# Patient Record
Sex: MALE | Race: WHITE | Employment: OTHER | URBAN - METROPOLITAN AREA
[De-identification: names, ages, dates, MRNs, and addresses within clinical notes are randomized per-mention and may not be internally consistent; named-entity substitution may affect disease eponyms.]

---

## 2017-07-18 PROBLEM — F03.90 DEMENTIA WITHOUT BEHAVIORAL DISTURBANCE (HCC): Status: ACTIVE | Noted: 2017-07-18

## 2017-08-08 ENCOUNTER — HOSPITAL ENCOUNTER (OUTPATIENT)
Dept: PAIN MANAGEMENT | Age: 67
Discharge: HOME | End: 2017-08-08
Payer: MEDICARE

## 2017-08-08 VITALS
BODY MASS INDEX: 37.93 KG/M2 | HEIGHT: 66 IN | OXYGEN SATURATION: 98 % | TEMPERATURE: 98.6 F | HEART RATE: 68 BPM | RESPIRATION RATE: 20 BRPM | WEIGHT: 236 LBS

## 2017-08-08 DIAGNOSIS — M25.571 CHRONIC PAIN OF RIGHT ANKLE: Primary | ICD-10-CM

## 2017-08-08 DIAGNOSIS — M12.571 TRAUMATIC ARTHRITIS OF ANKLE, RIGHT: ICD-10-CM

## 2017-08-08 DIAGNOSIS — G89.29 CHRONIC PAIN OF RIGHT ANKLE: Primary | ICD-10-CM

## 2017-08-08 DIAGNOSIS — Z98.1 S/P ANKLE FUSION: ICD-10-CM

## 2017-08-08 PROCEDURE — 80307 DRUG TEST PRSMV CHEM ANLYZR: CPT

## 2017-08-08 PROCEDURE — 99205 OFFICE O/P NEW HI 60 MIN: CPT

## 2017-08-08 PROCEDURE — 99204 OFFICE O/P NEW MOD 45 MIN: CPT | Performed by: PAIN MEDICINE

## 2017-08-08 ASSESSMENT — PAIN DESCRIPTION - FREQUENCY: FREQUENCY: CONTINUOUS

## 2017-08-08 ASSESSMENT — ENCOUNTER SYMPTOMS
NAUSEA: 0
PHOTOPHOBIA: 0
COUGH: 0
HEARTBURN: 0
ORTHOPNEA: 0
SPUTUM PRODUCTION: 0
BLURRED VISION: 0
VOMITING: 0
CONSTIPATION: 0

## 2017-08-08 ASSESSMENT — PAIN DESCRIPTION - ONSET: ONSET: ON-GOING

## 2017-08-08 ASSESSMENT — PAIN DESCRIPTION - LOCATION: LOCATION: ANKLE

## 2017-08-08 ASSESSMENT — PAIN DESCRIPTION - ORIENTATION: ORIENTATION: RIGHT

## 2017-08-08 ASSESSMENT — PAIN DESCRIPTION - DESCRIPTORS: DESCRIPTORS: ACHING;CONSTANT;SHARP

## 2017-08-08 ASSESSMENT — PAIN SCALES - GENERAL: PAINLEVEL_OUTOF10: 8

## 2017-08-08 ASSESSMENT — PAIN DESCRIPTION - PAIN TYPE: TYPE: CHRONIC PAIN

## 2017-08-08 ASSESSMENT — PAIN DESCRIPTION - PROGRESSION: CLINICAL_PROGRESSION: NOT CHANGED

## 2017-08-12 LAB
6-ACETYLMORPHINE, UR: NOT DETECTED
7-AMINOCLONAZEPAM, URINE: NOT DETECTED
ALPHA-OH-ALPRAZ, URINE: NOT DETECTED
ALPRAZOLAM, URINE: NOT DETECTED
AMPHETAMINES, URINE: NOT DETECTED
BARBITURATES, URINE: NOT DETECTED
BENZOYLECGONINE, UR: NOT DETECTED
BUPRENORPHINE URINE: NOT DETECTED
CARISOPRODOL, UR: NOT DETECTED
CLONAZEPAM, URINE: NOT DETECTED
CODEINE, URINE: NOT DETECTED
CREATININE URINE: 361.3 MG/DL (ref 20–400)
DIAZEPAM, URINE: NOT DETECTED
DRUGS EXPECTED, UR: NORMAL
EER HI RES INTERP UR: NORMAL
ETHYL GLUCURONIDE UR: NOT DETECTED
FENTANYL URINE: NOT DETECTED
HYDROCODONE, URINE: NOT DETECTED
HYDROMORPHONE, URINE: NOT DETECTED
LORAZEPAM, URINE: NOT DETECTED
MARIJUANA METAB, UR: NOT DETECTED
MDA, UR: NOT DETECTED
MDEA, EVE, UR: NOT DETECTED
MDMA URINE: NOT DETECTED
MEPERIDINE METAB, UR: NOT DETECTED
METHADONE, URINE: NOT DETECTED
METHAMPHETAMINE, URINE: NOT DETECTED
METHYLPHENIDATE: NOT DETECTED
MIDAZOLAM, URINE: NOT DETECTED
MORPHINE URINE: NOT DETECTED
NORBUPRENORPHINE, URINE: NOT DETECTED
NORDIAZEPAM, URINE: NOT DETECTED
NORFENTANYL, URINE: NOT DETECTED
NORHYDROCODONE, URINE: NOT DETECTED
NOROXYCODONE, URINE: NOT DETECTED
NOROXYMORPHONE, URINE: NOT DETECTED
OXAZEPAM, URINE: NOT DETECTED
OXYCODONE URINE: NOT DETECTED
OXYMORPHONE, URINE: NOT DETECTED
PAIN MANAGEMENT DRUG PANEL INTERP, URINE: NORMAL
PAIN MGT DRUG PANEL, HI RES, UR: NORMAL
PCP,URINE: NOT DETECTED
PHENTERMINE, UR: NOT DETECTED
PROPOXYPHENE, URINE: NOT DETECTED
TAPENTADOL, URINE: NOT DETECTED
TAPENTADOL-O-SULFATE, URINE: NOT DETECTED
TEMAZEPAM, URINE: NOT DETECTED
TRAMADOL, URINE: NOT DETECTED
ZOLPIDEM, URINE: NOT DETECTED

## 2017-08-22 ENCOUNTER — HOSPITAL ENCOUNTER (OUTPATIENT)
Dept: PAIN MANAGEMENT | Age: 67
Discharge: HOME | End: 2017-08-22
Payer: MEDICARE

## 2017-08-22 ENCOUNTER — HOSPITAL ENCOUNTER (OUTPATIENT)
Dept: GENERAL RADIOLOGY | Age: 67
Discharge: HOME | End: 2017-08-22
Payer: MEDICARE

## 2017-08-22 VITALS
OXYGEN SATURATION: 98 % | HEIGHT: 66 IN | HEART RATE: 59 BPM | WEIGHT: 236 LBS | TEMPERATURE: 98.2 F | DIASTOLIC BLOOD PRESSURE: 91 MMHG | BODY MASS INDEX: 37.93 KG/M2 | SYSTOLIC BLOOD PRESSURE: 151 MMHG | RESPIRATION RATE: 16 BRPM

## 2017-08-22 DIAGNOSIS — Z98.1 S/P ANKLE FUSION: ICD-10-CM

## 2017-08-22 DIAGNOSIS — M12.571 TRAUMATIC ARTHRITIS OF ANKLE, RIGHT: Primary | ICD-10-CM

## 2017-08-22 DIAGNOSIS — R52 PAIN: ICD-10-CM

## 2017-08-22 PROCEDURE — 20610 DRAIN/INJ JOINT/BURSA W/O US: CPT

## 2017-08-22 PROCEDURE — 6360000002 HC RX W HCPCS

## 2017-08-22 PROCEDURE — 20605 DRAIN/INJ JOINT/BURSA W/O US: CPT | Performed by: PAIN MEDICINE

## 2017-08-22 PROCEDURE — 77002 NEEDLE LOCALIZATION BY XRAY: CPT

## 2017-08-22 ASSESSMENT — PAIN DESCRIPTION - DIRECTION: RADIATING_TOWARDS: ANKLE AND FOOT

## 2017-08-22 ASSESSMENT — PAIN DESCRIPTION - FREQUENCY: FREQUENCY: CONTINUOUS

## 2017-08-22 ASSESSMENT — PAIN DESCRIPTION - LOCATION: LOCATION: ANKLE

## 2017-08-22 ASSESSMENT — ACTIVITIES OF DAILY LIVING (ADL): EFFECT OF PAIN ON DAILY ACTIVITIES: UNABLE TO WALK SHORT DISTANCES WITHOUT HAVING PAIN

## 2017-08-22 ASSESSMENT — PAIN DESCRIPTION - DESCRIPTORS: DESCRIPTORS: ACHING;CONSTANT;SHARP

## 2017-08-22 ASSESSMENT — PAIN DESCRIPTION - ONSET: ONSET: ON-GOING

## 2017-08-22 ASSESSMENT — PAIN - FUNCTIONAL ASSESSMENT: PAIN_FUNCTIONAL_ASSESSMENT: 0-10

## 2017-08-22 ASSESSMENT — PAIN DESCRIPTION - PROGRESSION: CLINICAL_PROGRESSION: NOT CHANGED

## 2017-08-22 ASSESSMENT — PAIN DESCRIPTION - PAIN TYPE: TYPE: CHRONIC PAIN

## 2017-08-22 ASSESSMENT — PAIN DESCRIPTION - ORIENTATION: ORIENTATION: RIGHT

## 2017-08-22 ASSESSMENT — PAIN SCALES - GENERAL: PAINLEVEL_OUTOF10: 8

## 2017-08-23 ENCOUNTER — TELEPHONE (OUTPATIENT)
Dept: PAIN MANAGEMENT | Age: 67
End: 2017-08-23

## 2017-09-12 ENCOUNTER — HOSPITAL ENCOUNTER (OUTPATIENT)
Dept: PAIN MANAGEMENT | Age: 67
Discharge: HOME | End: 2017-09-12
Payer: MEDICARE

## 2017-09-12 VITALS
WEIGHT: 240 LBS | SYSTOLIC BLOOD PRESSURE: 125 MMHG | HEIGHT: 67 IN | HEART RATE: 61 BPM | RESPIRATION RATE: 20 BRPM | DIASTOLIC BLOOD PRESSURE: 55 MMHG | BODY MASS INDEX: 37.67 KG/M2 | OXYGEN SATURATION: 96 %

## 2017-09-12 DIAGNOSIS — Z98.1 S/P ANKLE FUSION: ICD-10-CM

## 2017-09-12 DIAGNOSIS — M12.571 TRAUMATIC ARTHRITIS OF ANKLE, RIGHT: Primary | ICD-10-CM

## 2017-09-12 DIAGNOSIS — G89.29 CHRONIC PAIN OF RIGHT ANKLE: ICD-10-CM

## 2017-09-12 DIAGNOSIS — M25.571 CHRONIC PAIN OF RIGHT ANKLE: ICD-10-CM

## 2017-09-12 PROCEDURE — 99213 OFFICE O/P EST LOW 20 MIN: CPT | Performed by: NURSE PRACTITIONER

## 2017-09-12 PROCEDURE — 99213 OFFICE O/P EST LOW 20 MIN: CPT

## 2017-09-12 ASSESSMENT — ENCOUNTER SYMPTOMS
GASTROINTESTINAL NEGATIVE: 1
RESPIRATORY NEGATIVE: 1
EYES NEGATIVE: 1

## 2017-10-03 ENCOUNTER — OFFICE VISIT (OUTPATIENT)
Dept: NEUROLOGY | Age: 67
End: 2017-10-03
Payer: MEDICARE

## 2017-10-03 VITALS
HEIGHT: 67 IN | DIASTOLIC BLOOD PRESSURE: 79 MMHG | SYSTOLIC BLOOD PRESSURE: 180 MMHG | HEART RATE: 55 BPM | BODY MASS INDEX: 38.48 KG/M2 | WEIGHT: 245.2 LBS

## 2017-10-03 DIAGNOSIS — F32.9 MAJOR DEPRESSION, CHRONIC: Chronic | ICD-10-CM

## 2017-10-03 DIAGNOSIS — R41.89 PSEUDODEMENTIA: Primary | ICD-10-CM

## 2017-10-03 PROCEDURE — 1036F TOBACCO NON-USER: CPT | Performed by: PSYCHIATRY & NEUROLOGY

## 2017-10-03 PROCEDURE — G8417 CALC BMI ABV UP PARAM F/U: HCPCS | Performed by: PSYCHIATRY & NEUROLOGY

## 2017-10-03 PROCEDURE — 3017F COLORECTAL CA SCREEN DOC REV: CPT | Performed by: PSYCHIATRY & NEUROLOGY

## 2017-10-03 PROCEDURE — G8427 DOCREV CUR MEDS BY ELIG CLIN: HCPCS | Performed by: PSYCHIATRY & NEUROLOGY

## 2017-10-03 PROCEDURE — 1123F ACP DISCUSS/DSCN MKR DOCD: CPT | Performed by: PSYCHIATRY & NEUROLOGY

## 2017-10-03 PROCEDURE — 4040F PNEUMOC VAC/ADMIN/RCVD: CPT | Performed by: PSYCHIATRY & NEUROLOGY

## 2017-10-03 PROCEDURE — 99204 OFFICE O/P NEW MOD 45 MIN: CPT | Performed by: PSYCHIATRY & NEUROLOGY

## 2017-10-03 PROCEDURE — G8484 FLU IMMUNIZE NO ADMIN: HCPCS | Performed by: PSYCHIATRY & NEUROLOGY

## 2017-10-03 NOTE — PROGRESS NOTES
HPI:        I had the pleasure of seeing your patient Erik Rascon in neurologic evaluation for memory loss. The history is from the patient as well as from the wife. The problem began 3-4 years ago. Things are getting worse over time. An inciting event like trauma, infection or intoxication is not recalled but he remarks about hitting his head on a pipe 10 years ago. Specifically the patient has been having difficulty learning and retaining new information (eg, trouble remembering recent or new events and immediate recall), with reasoning (eg, unable to cope with unexpected events or new events), with spatial ability and orientation (eg, getting lost in familiar places or in a shopping mall) and with language (eg, word finding difficulty). Associated neurologic problems include behavior change/personality change, depression, speech/language difficulty, longstanding hearing change/tinnitus and a sleep disorder of obstructive sleep apnea warranting CPAP therapy. There is NO hallucination/delusion, seizure/blackout/syncope, tremor, drooling, gait disturbance, bladder/bowel disturbance, focal weakness/numbness or vertigo. 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 The patient and wife report some significant fluctuations of symptoms on a day to day basis. Relevant is that the patient has lost interest in typical hobbies/activites.  There is no report of hyperphagia, inappropriate laughter or crying. Robin Bailey has not restricted activities such as driving. The patient lives with his spouse and is able to do  ADLs (activities of daily living) including showering, dressing or regular household chores. As part of the work up, Robin Bailey has never had a CT/MRI brain image. No other testing such as an EEG has been done. Laboratories from November 2016 included normal electrolytes, BUN, creatinine, calcium, liver profile, PSA, hepatitis C and lipid panel aside from a slightly elevated triglycerides at 159. Also blood sugar was borderline at 105.                                     REVIEW OF SYSTEMS    CONSTITUTIONAL Weight: present, Appetite: absent, Fatigue: present      HEENT Ears: diminished,  tinnitus Eyes: glasses, Visual disturbance: absent   RESPIRATORY Shortness of breath: present, Cough: absent   CARDIOVASCULAR Chest pain: absent, Leg swelling :absent      GI Constipation: absent, Diarrhea: absent, Swallowing change: absent       Urinary frequency: absent, Urinary urgency: absent, Urinary incontinence: absent   MUSCULOSKELETAL Neck pain: absent, Back pain: present, Stiffness: absent, Muscle pain: present, Joint pain: absent Restless legs: absent   DERMATOLOGIC Hair loss: absent, Skin changes: absent   NEUROLOGIC Memory loss: present, Confusion: present, Seizures: absent Trouble walking or imbalance: present, Dizziness: present, Weakness: present, Numbness: absent Tremor: absent, Spasm: absent, Speech difficulty: present, Headache: absent, Light sensitivity: absent   PSYCHIATRIC Anxiety: absent, Hallucination: absent, Mood disorder: present, depression   HEMATOLOGIC Abnormal bleeding: absent, Anemia: absent, Clotting disorder: absent, Lymph gland changes: absent                   Outpatient Prescriptions Marked as Taking for the 10/3/17 encounter (Office Visit) with Lali Mota MD   Medication Sig Dispense Refill    buPROPion [facility]

## 2017-10-03 NOTE — LETTER
Ears:  Normal external ear and canals   Nose: Nares normal, mucosa normal, no drainage    Throat: Lips and tongue normal; teeth normal;  gums normal   Neck: Supple neck with intact flexion, extension and rotation;   trachea midline;  no adenopathy;   thyroid: not enlarged;   no carotid pulse abnormality   Back:   Symmetric, no curvature, ROM adequate   Lungs:   Respirations unlabored   Chest Wall:  No deformity   Heart:  Regular rate and rhythm   Abdomen:   No masses but protuberant   Extremities: Extremities normal but right ankle fused, no cyanosis, no edema   Pulses: Symmetric over head and neck   Skin: Skin color, texture normal, no rashes, no lesions   Lymph nodes: Cervical, supraclavicular nodes normal                                         NEUROLOGIC EXAMINATION    MENTAL STATUS: Alert, oriented, intact memory, no confusion, normal speech, normal language, no hallucination or delusion, MMSE = 30/30   CRANIAL NERVES: II     -      Visual fields intact to confrontation  III,IV,VI -  EOMs full, no afferent defect, no                      ANNALISA, no ptosis  V     -     Normal facial sensation  VII    -     Normal facial symmetry  VIII   -     Intact hearing with hearing aids  IX,X -     Symmetrical palate  XI    -     Symmetrical shoulder shrug  XII   -     Midline tongue, no atrophy    MOTOR FUNCTION: Normal bulk, normal tone, normal power;  no involuntary movements, no tremor   SENSORY FUNCTION: Normal touch, normal pin, normal vibration   CEREBELLAR FUNCTION: Intact fine motor control over upper limbs   REFLEX FUNCTION: Symmetric, no perverted reflex   STATION and GAIT Normal station, normal gait even with fused ankle               ASSESSMENT and  PLAN:      In summary, your patient, Joseph Rascon suggests 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 This is supported by the intact Folstein battery or Mini-Mental status examination.   Also he is

## 2017-10-03 NOTE — COMMUNICATION BODY
HPI:        I had the pleasure of seeing your patient Yeni Rascon in neurologic evaluation for memory loss. The history is from the patient as well as from the wife. The problem began 3-4 years ago. Things are getting worse over time. An inciting event like trauma, infection or intoxication is not recalled but he remarks about hitting his head on a pipe 10 years ago. Specifically the patient has been having difficulty learning and retaining new information (eg, trouble remembering recent or new events and immediate recall), with reasoning (eg, unable to cope with unexpected events or new events), with spatial ability and orientation (eg, getting lost in familiar places or in a shopping mall) and with language (eg, word finding difficulty). Associated neurologic problems include behavior change/personality change, depression, speech/language difficulty, longstanding hearing change/tinnitus and a sleep disorder of obstructive sleep apnea warranting CPAP therapy. There is NO hallucination/delusion, seizure/blackout/syncope, tremor, drooling, gait disturbance, bladder/bowel disturbance, focal weakness/numbness or vertigo. 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 The patient and wife report some significant fluctuations of symptoms on a day to day basis. Relevant is that the patient has lost interest in typical hobbies/activites.  There is no report of hyperphagia, inappropriate laughter or crying. Shaun Blanton has not restricted activities such as driving. The patient lives with his spouse and is able to do  ADLs (activities of daily living) including showering, dressing or regular household chores. As part of the work up, Shaun Blanton has never had a CT/MRI brain image. No other testing such as an EEG has been done. Laboratories from November 2016 included normal electrolytes, BUN, creatinine, calcium, liver profile, PSA, hepatitis C and lipid panel aside from a slightly elevated triglycerides at 159. Also blood sugar was borderline at 105.                                     REVIEW OF SYSTEMS    CONSTITUTIONAL Weight: present, Appetite: absent, Fatigue: present      HEENT Ears: diminished,  tinnitus Eyes: glasses, Visual disturbance: absent   RESPIRATORY Shortness of breath: present, Cough: absent   CARDIOVASCULAR Chest pain: absent, Leg swelling :absent      GI Constipation: absent, Diarrhea: absent, Swallowing change: absent       Urinary frequency: absent, Urinary urgency: absent, Urinary incontinence: absent   MUSCULOSKELETAL Neck pain: absent, Back pain: present, Stiffness: absent, Muscle pain: present, Joint pain: absent Restless legs: absent   DERMATOLOGIC Hair loss: absent, Skin changes: absent   NEUROLOGIC Memory loss: present, Confusion: present, Seizures: absent Trouble walking or imbalance: present, Dizziness: present, Weakness: present, Numbness: absent Tremor: absent, Spasm: absent, Speech difficulty: present, Headache: absent, Light sensitivity: absent   PSYCHIATRIC Anxiety: absent, Hallucination: absent, Mood disorder: present, depression   HEMATOLOGIC Abnormal bleeding: absent, Anemia: absent, Clotting disorder: absent, Lymph gland changes: absent                   Outpatient Prescriptions Marked as Taking for the 10/3/17 encounter (Office Visit) with Castillo Jimenez MD   Medication Sig Dispense Refill    buPROPion [facility] SR) 150 MG extended release tablet Take 1 tablet by mouth 2 times daily 60 tablet 11    warfarin (COUMADIN) 5 MG tablet TAKE 1 TO 2 TABLETS EVERY  tablet 3    atenolol (TENORMIN) 25 MG tablet TAKE 1 TABLET TWICE DAILY 180 tablet 3    omeprazole (PRILOSEC) 40 MG delayed release capsule TAKE 1 CAPSULE EVERY DAY 90 capsule 3    atorvastatin (LIPITOR) 40 MG tablet TAKE 1 TABLET EVERY DAY 90 tablet 3    citalopram (CELEXA) 40 MG tablet TAKE 1 TABLET EVERY DAY 90 tablet 3    etodolac (LODINE) 500 MG tablet Take 1 tablet by mouth 2 times daily 180 tablet 3    losartan (COZAAR) 100 MG tablet TAKE 1 TABLET BY MOUTH DAILY 90 tablet 3    donepezil (ARICEPT) 10 MG tablet TAKE 1 TABLET EVERY DAY 90 tablet 3    fluticasone (FLONASE) 50 MCG/ACT nasal spray USE 2 SPRAYS IN EACH NOSTRIL EVERY DAY 3 Bottle 3    SYMBICORT 160-4.5 MCG/ACT AERO Inhale 2 puffs into the lungs daily      B Complex Vitamins (VITAMIN B COMPLEX PO) Take 1 tablet by mouth daily                                            PHYSICAL EXAMINATION                                              .                                                                                                     General Appearance:  Alert, cooperative, no signs of distress, appears stated age, obese, reasonably dressed and groomed, occasional sighing    Head:  Normocephalic, no signs of trauma   Eyes:  Conjunctiva/corneas clear;  eyelids intact   Ears:  Normal external ear and canals   Nose: Nares normal, mucosa normal, no drainage    Throat: Lips and tongue normal; teeth normal;  gums normal   Neck: Supple neck with intact flexion, extension and rotation;   trachea midline;  no adenopathy;   thyroid: not enlarged;   no carotid pulse abnormality   Back:   Symmetric, no curvature, ROM adequate   Lungs:   Respirations unlabored   Chest Wall:  No deformity   Heart:  Regular rate and rhythm   Abdomen:   No masses but protuberant   Extremities: Extremities normal but right including thyroid and vitamin profiles. 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 Meanwhile he will keep me informed of any questions or new problems and return for prompt reevaluation in 1 month to discuss the results of testing and a more specific treatment plan.

## 2017-10-03 NOTE — MR AVS SNAPSHOT
After Visit Summary             Victor Manuel Rascon   10/3/2017 8:00 AM   Office Visit    Description:  Male : 1950   Provider:  Teddy Chakraborty MD   Department:  [facility]              Your Follow-Up and Future Appointments         Below is a list of your follow-up and future appointments. This may not be a complete list as you may have made appointments directly with providers that we are not aware of or your providers may have made some for you. Please call your providers to confirm appointments. It is important to keep your appointments. Please bring your current insurance card, photo ID, co-pay, and all medication bottles to your appointment. If self-pay, payment is expected at the time of service. Your To-Do List     Future Appointments Provider Department Dept Phone    10/10/2017 7:40 AM MD [facility] [phone]    10/19/2017 9:00 AM Mallorie Guido MD [facility] [phone]    Please arrive 15 minutes prior to appointment, bring photo ID and insurance card. 2017 9:20 AM MD [facility] [phone]    2017 4:40 PM Teddy Chakraborty MD [facility] [phone]    Please arrive 15 minutes prior to appointment time, bring insurance card and photo ID. Future Orders Complete By Expires    ALT [SUU502 Custom]  2017 10/3/2018    BUN & Creatinine [[id number] Custom]  2017 10/3/2018    CBC [[id number] Custom]  2017 10/3/2018    Electrolyte Panel [LAB16 Custom]  2017 10/3/2018    HEAVY METAL SCREEN, 24HR URINE [[id number] Custom]  2017 10/3/2018    MRI Brain WO Contrast [[id number] Custom]  2017 10/3/2018    TSH with Reflex [[id number] Custom]  2017 10/3/2018    Vitamin B12 & Folate [[id number] Custom]  2017 10/3/2018    Follow-Up    Return in about 1 month (around 11/3/2017).          Information from Your Visit        Department fluticasone (FLONASE) 50 MCG/ACT nasal spray USE 2 SPRAYS IN EACH NOSTRIL EVERY DAY    SYMBICORT 160-4.5 MCG/ACT AERO Inhale 2 puffs into the lungs daily    B Complex Vitamins (VITAMIN B COMPLEX PO) Take 1 tablet by mouth daily      Allergies           No Known Allergies         Additional Information        Basic Information     Date Of Birth Sex Race Ethnicity Preferred Language    1950 Male White Non-/Non  English      Problem List as of 10/3/2017  Date Reviewed: 10/3/2017                S/P ankle fusion    Traumatic arthritis of ankle    Chronic pain of right ankle    Dementia without behavioral disturbance    Viral URI with cough    Acute pansinusitis    Gastroesophageal reflux disease without esophagitis    History of DVT (deep vein thrombosis) (Chronic)    Major depression, chronic (Chronic)    Severe obesity (BMI 35.0-35.9 with comorbidity) (HCC) (Chronic)    Carpal tunnel syndrome    Cervical radicular pain    Spinal stenosis in cervical region    Degenerative disc disease, cervical    Hyperlipidemia with target LDL less than 100 (Chronic)    Allergic rhinitis    Asthma      Immunizations as of 10/3/2017     Name Date    Influenza Whole 10/20/2015    Influenza, Quadv, 3 Years and older, IM 9/29/2016    Pneumococcal 13-valent Conjugate (Gkugarx19) 10/18/2015    Pneumococcal Polysaccharide (Htrhyjiep17) 10/17/2014      Preventive Care        Date Due    One-time abdominal aortic aneurism (AAA) screening is recommended for all men between the age of 73-68 who have ever smoked 1950    Yearly Flu Vaccine (1) 9/1/2017    Colon Cancer Stool Test 10/30/2017    Tetanus Combination Vaccine (1 - Tdap) 1/1/2025 (Originally 6/16/1968)    Diabetes Screening 11/26/2019    Cholesterol Screening 11/26/2021            MyChart Signup           [employer] allows you to send messages to your doctor, view your test results, renew your prescriptions, schedule appointments, view visit notes, and more.

## 2017-10-10 ENCOUNTER — HOSPITAL ENCOUNTER (OUTPATIENT)
Dept: PAIN MANAGEMENT | Age: 67
Discharge: HOME | End: 2017-10-10
Payer: MEDICARE

## 2017-10-10 ENCOUNTER — HOSPITAL ENCOUNTER (OUTPATIENT)
Dept: GENERAL RADIOLOGY | Age: 67
Discharge: HOME | End: 2017-10-10
Payer: MEDICARE

## 2017-10-10 VITALS
HEART RATE: 54 BPM | WEIGHT: 245 LBS | BODY MASS INDEX: 38.45 KG/M2 | HEIGHT: 67 IN | RESPIRATION RATE: 16 BRPM | OXYGEN SATURATION: 96 % | TEMPERATURE: 99.6 F | SYSTOLIC BLOOD PRESSURE: 147 MMHG | DIASTOLIC BLOOD PRESSURE: 72 MMHG

## 2017-10-10 DIAGNOSIS — Z98.1 S/P ANKLE FUSION: ICD-10-CM

## 2017-10-10 DIAGNOSIS — M12.571 TRAUMATIC ARTHRITIS OF ANKLE, RIGHT: Primary | ICD-10-CM

## 2017-10-10 DIAGNOSIS — M25.571 CHRONIC PAIN OF RIGHT ANKLE: ICD-10-CM

## 2017-10-10 DIAGNOSIS — G89.29 CHRONIC PAIN OF RIGHT ANKLE: ICD-10-CM

## 2017-10-10 DIAGNOSIS — R52 PAIN: ICD-10-CM

## 2017-10-10 PROCEDURE — 20610 DRAIN/INJ JOINT/BURSA W/O US: CPT

## 2017-10-10 PROCEDURE — 20610 DRAIN/INJ JOINT/BURSA W/O US: CPT | Performed by: PAIN MEDICINE

## 2017-10-10 PROCEDURE — 77002 NEEDLE LOCALIZATION BY XRAY: CPT

## 2017-10-10 PROCEDURE — 6360000002 HC RX W HCPCS

## 2017-10-10 PROCEDURE — 2500000003 HC RX 250 WO HCPCS

## 2017-10-10 ASSESSMENT — PAIN DESCRIPTION - DESCRIPTORS: DESCRIPTORS: ACHING

## 2017-10-10 ASSESSMENT — PAIN - FUNCTIONAL ASSESSMENT
PAIN_FUNCTIONAL_ASSESSMENT: 0-10
PAIN_FUNCTIONAL_ASSESSMENT: 0-10

## 2017-10-10 NOTE — PROGRESS NOTES
Discharge criteria met. Patient alert and oriented x3  Post procedure dressing dry and intact. Sensory and motor function intact as per pre-procedure. Instructions and follow up reviewed with pt at patient at discharge.   Patient discharged per wheelchair  @ 1708

## 2017-10-10 NOTE — PROCEDURES
Robin Bailey is a 75 y.o. male patient. 1. Traumatic arthritis of ankle, right    2. S/P ankle fusion    3. Chronic pain of right ankle      Past Medical History:   Diagnosis Date    Acute pansinusitis     Allergic rhinitis     Asthma     Asthma     Dementia without behavioral disturbance     DVT [facility]deep [facility] thrombosis[facility]     Gastroesophageal reflux     History of DVT (deep vein thrombosis) 8/27/2015    Hx of blood clots     Hyperlipidemia     Hyperlipidemia LDL goal < 100     Hypertension     Hypertension, benign     Major depression, chronic 8/27/2015    Osteoarthritis     Severe obesity (BMI 35.0-35.9 with comorbidity) ([facility] 8/27/2015    Sleep apnea      Blood pressure (!) 147/72, pulse 54, temperature 99.6 °F (37.6 °C), temperature source Oral, resp. rate 16, height 5' 7\" (1.702 m), weight 245 lb (111.1 kg), SpO2 96 %. Procedures  Procedure Done:   Right ankle joint injection     Indication for the Procedure:    Patient is a 79-year-old male who has history of right ankle pain of long-standing duration. Patient reports that he had arthritis of the ankle and the had a fusion done 10 years ago. Since then he was having pain in the ankle. He  had 2 surgeries on the ankle as the 1st fusion did not result in fusion. . Patient reports he did not get much treatment for his ankle pain so far. He was seen by Dr. Jerilyn Mathews who referred him for pain management. X-ray report for Dr. Tarik Cardoso was read as patchy osteopenia throughout the bones of the midfoot the calcaneus and distal tibia. There are 3 partially threaded screws traversing the ankle and subtalar joints. There is reactive bony growth at the posterior and medial ankle and the addition to the screw heads. These is exostosis at the dorsal talonavicular joint. The repeat is to be surgical fusion of the ankle and subtalar joints. Patient  Had injection of rt. Ankle on 8/22/17 with the good improvement in the pain.    His pain has

## 2017-10-12 ENCOUNTER — HOSPITAL ENCOUNTER (OUTPATIENT)
Dept: MRI IMAGING | Facility: CLINIC | Age: 67
Discharge: HOME | End: 2017-10-12
Payer: MEDICARE

## 2017-10-12 DIAGNOSIS — R41.89 PSEUDODEMENTIA: ICD-10-CM

## 2017-10-12 PROCEDURE — 70551 MRI BRAIN STEM W/O DYE: CPT

## 2017-10-16 ENCOUNTER — TELEPHONE (OUTPATIENT)
Dept: NEUROLOGY | Age: 67
End: 2017-10-16

## 2017-10-16 NOTE — TELEPHONE ENCOUNTER
Charmaine Samuels called in. He wondered if Dr. Di Hester thought any of his medications might be causing some memory issues. he thought he had read one of the pamphlets that talked about memory problems. Is there anything that stands out that might cause problems?

## 2017-10-16 NOTE — TELEPHONE ENCOUNTER
Of all his medicines, the Wellbutrin and Celexa may have cognitive effects but generally these are not severe.

## 2017-10-19 DIAGNOSIS — R41.89 PSEUDODEMENTIA: ICD-10-CM

## 2017-10-19 DIAGNOSIS — F32.9 MAJOR DEPRESSION, CHRONIC: Chronic | ICD-10-CM

## 2017-10-19 PROBLEM — H61.21 IMPACTED CERUMEN OF RIGHT EAR: Status: ACTIVE | Noted: 2017-10-19

## 2017-10-26 DIAGNOSIS — R41.89 PSEUDODEMENTIA: ICD-10-CM

## 2017-10-30 DIAGNOSIS — R41.89 PSEUDODEMENTIA: ICD-10-CM

## 2017-11-07 ENCOUNTER — OFFICE VISIT (OUTPATIENT)
Dept: NEUROLOGY | Age: 67
End: 2017-11-07
Payer: MEDICARE

## 2017-11-07 VITALS
BODY MASS INDEX: 38.61 KG/M2 | WEIGHT: 246 LBS | SYSTOLIC BLOOD PRESSURE: 183 MMHG | HEIGHT: 67 IN | HEART RATE: 53 BPM | DIASTOLIC BLOOD PRESSURE: 76 MMHG

## 2017-11-07 DIAGNOSIS — R41.89 PSEUDODEMENTIA: Primary | ICD-10-CM

## 2017-11-07 PROCEDURE — G8417 CALC BMI ABV UP PARAM F/U: HCPCS | Performed by: PSYCHIATRY & NEUROLOGY

## 2017-11-07 PROCEDURE — 4040F PNEUMOC VAC/ADMIN/RCVD: CPT | Performed by: PSYCHIATRY & NEUROLOGY

## 2017-11-07 PROCEDURE — 1036F TOBACCO NON-USER: CPT | Performed by: PSYCHIATRY & NEUROLOGY

## 2017-11-07 PROCEDURE — 99213 OFFICE O/P EST LOW 20 MIN: CPT | Performed by: PSYCHIATRY & NEUROLOGY

## 2017-11-07 PROCEDURE — G8482 FLU IMMUNIZE ORDER/ADMIN: HCPCS | Performed by: PSYCHIATRY & NEUROLOGY

## 2017-11-07 PROCEDURE — 1123F ACP DISCUSS/DSCN MKR DOCD: CPT | Performed by: PSYCHIATRY & NEUROLOGY

## 2017-11-07 PROCEDURE — G8427 DOCREV CUR MEDS BY ELIG CLIN: HCPCS | Performed by: PSYCHIATRY & NEUROLOGY

## 2017-11-07 PROCEDURE — 3017F COLORECTAL CA SCREEN DOC REV: CPT | Performed by: PSYCHIATRY & NEUROLOGY

## 2017-11-07 NOTE — PROGRESS NOTES
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 Despite the depressive nature of his condition, I advised further investigation due to the stroke risk factors and the possibility that there could be another explanation. Subsequently, a MRI of the brain from October 2017 revealed mild parenchymal volume loss, mild chronic microvascular disease and a moderate right mastoid effusion. I took time out today to pull up the images and review them with him and his wife. As an aside, for the mastoid issue he may need to see an ENT physician. Laboratories from October 2017 included a normal TSH, B12, folate, electrolytes, CBC, heavy metal screen in the urine, BUN, creatinine and ALT. As you may recall a Folstein battery or Mini-Mental state examination on his initial visit in October was normal at 30/30. In the interval, there have been no new medical or surgical issues nor any recent trauma, infection/fever or intoxication to complicate matters.                                   REVIEW OF SYSTEMS    CONSTITUTIONAL Weight: absent, Appetite: absent, Fatigue: present      HEENT Ears: diminished,  tinnitus Eyes: glasses, Visual disturbance: absent   RESPIRATORY Shortness of breath: absent, Cough: absent   CARDIOVASCULAR Chest pain: absent, Leg swelling :absent      GI Constipation: absent, Diarrhea: absent, Swallowing change: absent       Urinary frequency: absent, Urinary urgency: absent, Urinary incontinence: absent   MUSCULOSKELETAL Neck pain: absent, Back pain: absent, Stiffness: absent, Muscle pain: absent, Joint pain: absent,  Restless legs: absent   DERMATOLOGIC Hair loss: absent, Skin changes: absent   NEUROLOGIC Memory loss: absent, Confusion: absent, Seizures: absent Trouble walking or imbalance: absent, Dizziness: absent, Weakness: absent, Numbness: absent, Tremor: absent, Spasm: absent, Speech difficulty: absent, Headache: absent, Light sensitivity: absent   PSYCHIATRIC Anxiety: absent, Hallucination: absent, Mood disorder: absent   HEMATOLOGIC Abnormal bleeding: absent, Anemia: absent, Clotting disorder: absent, Lymph gland changes: absent                   Outpatient Prescriptions Marked as Taking for the 11/7/17 encounter (Office Visit) with Herminio Guerra MD   Medication Sig Dispense Refill    buPROPion (WELLBUTRIN SR) 150 MG extended release tablet Take 1 tablet by mouth 2 times daily 60 tablet 11    warfarin (COUMADIN) 5 MG tablet TAKE 1 TO 2 TABLETS EVERY  tablet 3    atenolol (TENORMIN) 25 MG tablet TAKE 1 TABLET TWICE DAILY 180 tablet 3    omeprazole (PRILOSEC) 40 MG delayed release capsule TAKE 1 CAPSULE EVERY DAY 90 capsule 3    atorvastatin (LIPITOR) 40 MG tablet TAKE 1 TABLET EVERY DAY 90 tablet 3    citalopram (CELEXA) 40 MG tablet TAKE 1 TABLET EVERY DAY 90 tablet 3    etodolac (LODINE) 500 MG tablet Take 1 tablet by mouth 2 times daily 180 tablet 3    losartan (COZAAR) 100 MG tablet TAKE 1 TABLET BY MOUTH DAILY 90 tablet 3    donepezil (ARICEPT) 10 MG tablet TAKE 1 TABLET EVERY DAY 90 tablet 3    fluticasone (FLONASE) 50 MCG/ACT nasal spray USE 2 SPRAYS IN EACH NOSTRIL EVERY DAY 3 Bottle 3    SYMBICORT 160-4.5 MCG/ACT AERO Inhale 2 puffs into the lungs daily      B Complex Vitamins (VITAMIN B COMPLEX PO) Take 1 tablet by mouth daily                                            PHYSICAL EXAMINATION                                              .                                                                                                     General Appearance:  Alert, cooperative, no signs of distress, appears stated age, obese, reasonably dressed and groomed    Head:  Normocephalic, no signs of trauma   Eyes:  Conjunctiva/corneas clear; eyelids intact   Ears:  Normal external ear and canals   Nose: Nares normal, mucosa normal, no drainage is the hypercoagulable syndrome and the need for Coumadin. This certainly puts him at risk of future ischemic cerebrovascular disease and there are some punctate white matter changes that already correlate with occult ischemic injury though there is insufficient evidence for a vascular dementia. Likewise there is no need for him to continue the Aricept therapy. There is no need for additional neurologic investigation but I have suggested he take up with you further management of his underlying depression. He states that his antidepressant medications seem to be helping but haven't provided him the \"get up and go\" that he really wants and needs. Possibly a psychiatrist can help. In the meantime, I will remain available for questions or future neurologic needs but I see no reason for him to return on a regular basis and suggested he continue in your good management.

## 2017-11-07 NOTE — COMMUNICATION BODY
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 Despite the depressive nature of his condition, I advised further investigation due to the stroke risk factors and the possibility that there could be another explanation. Subsequently, a MRI of the brain from October 2017 revealed mild parenchymal volume loss, mild chronic microvascular disease and a moderate right mastoid effusion. I took time out today to pull up the images and review them with him and his wife. As an aside, for the mastoid issue he may need to see an ENT physician. Laboratories from October 2017 included a normal TSH, B12, folate, electrolytes, CBC, heavy metal screen in the urine, BUN, creatinine and ALT. As you may recall a Folstein battery or Mini-Mental state examination on his initial visit in October was normal at 30/30. In the interval, there have been no new medical or surgical issues nor any recent trauma, infection/fever or intoxication to complicate matters.                                   REVIEW OF SYSTEMS    CONSTITUTIONAL Weight: absent, Appetite: absent, Fatigue: present      HEENT Ears: diminished,  tinnitus Eyes: glasses, Visual disturbance: absent   RESPIRATORY Shortness of breath: absent, Cough: absent   CARDIOVASCULAR Chest pain: absent, Leg swelling :absent      GI Constipation: absent, Diarrhea: absent, Swallowing change: absent       Urinary frequency: absent, Urinary urgency: absent, Urinary incontinence: absent   MUSCULOSKELETAL Neck pain: absent, Back pain: absent, Stiffness: absent, Muscle pain: absent, Joint pain: absent,  Restless legs: absent   DERMATOLOGIC Hair loss: absent, Skin changes: absent   NEUROLOGIC Memory loss: absent, Confusion: absent, Seizures: absent Trouble walking or imbalance: absent, Dizziness: absent, Weakness: absent, Numbness: absent, Tremor: absent, Spasm: absent, Speech difficulty: absent, Headache: absent, Light sensitivity: absent   PSYCHIATRIC Anxiety: absent, Hallucination: absent, Mood disorder: absent   HEMATOLOGIC Abnormal bleeding: absent, Anemia: absent, Clotting disorder: absent, Lymph gland changes: absent                   Outpatient Prescriptions Marked as Taking for the 11/7/17 encounter (Office Visit) with Isabela Romero MD   Medication Sig Dispense Refill    buPROPion (WELLBUTRIN SR) 150 MG extended release tablet Take 1 tablet by mouth 2 times daily 60 tablet 11    warfarin (COUMADIN) 5 MG tablet TAKE 1 TO 2 TABLETS EVERY  tablet 3    atenolol (TENORMIN) 25 MG tablet TAKE 1 TABLET TWICE DAILY 180 tablet 3    omeprazole (PRILOSEC) 40 MG delayed release capsule TAKE 1 CAPSULE EVERY DAY 90 capsule 3    atorvastatin (LIPITOR) 40 MG tablet TAKE 1 TABLET EVERY DAY 90 tablet 3    citalopram (CELEXA) 40 MG tablet TAKE 1 TABLET EVERY DAY 90 tablet 3    etodolac (LODINE) 500 MG tablet Take 1 tablet by mouth 2 times daily 180 tablet 3    losartan (COZAAR) 100 MG tablet TAKE 1 TABLET BY MOUTH DAILY 90 tablet 3    donepezil (ARICEPT) 10 MG tablet TAKE 1 TABLET EVERY DAY 90 tablet 3    fluticasone (FLONASE) 50 MCG/ACT nasal spray USE 2 SPRAYS IN EACH NOSTRIL EVERY DAY 3 Bottle 3    SYMBICORT 160-4.5 MCG/ACT AERO Inhale 2 puffs into the lungs daily      B Complex Vitamins (VITAMIN B COMPLEX PO) Take 1 tablet by mouth daily                                            PHYSICAL EXAMINATION                                              .                                                                                                     General Appearance:  Alert, cooperative, no signs of distress, appears stated age, obese, reasonably dressed and groomed    Head:  Normocephalic, no signs of trauma   Eyes:  Conjunctiva/corneas clear; eyelids intact   Ears:  Normal external ear and canals   Nose: Nares normal, mucosa normal, no drainage Throat: Lips and tongue normal; teeth normal; gums normal   Neck: Supple neck with intact flexion, extension and rotation;   trachea midline; no adenopathy; thyroid: not enlarged; no carotid pulse abnormality   Back:   Symmetric, no curvature, ROM adequate   Lungs:   Respirations unlabored   Chest Wall:  No deformity but barrel chested    Heart:  Regular rate and rhythm   Abdomen:   No masses but protuberant   Extremities: Extremities normal but right ankle fused, no cyanosis, no edema   Pulses: Symmetric over head and neck   Skin: Skin color, texture normal, no rashes, no lesions   Lymph nodes: Cervical, supraclavicular nodes normal                                         NEUROLOGIC EXAMINATION    MENTAL STATUS: Alert, oriented, intact memory, no confusion, normal speech, normal language, no hallucination or delusion   CRANIAL NERVES: II     -      Visual fields intact to confrontation  III,IV,VI -  EOMs full, no afferent defect, no                      ANNALISA, no ptosis  V     -     Normal facial sensation  VII    -     Normal facial symmetry  VIII   -     Intact hearing with hearing aids  IX,X -     Symmetrical palate  XI    -     Symmetrical shoulder shrug  XII   -     Midline tongue, no atrophy    MOTOR FUNCTION: Normal bulk, normal tone, normal power;  no involuntary movements, no tremor   SENSORY FUNCTION: Normal touch, normal pin, normal vibration   CEREBELLAR FUNCTION: Intact fine motor control over upper limbs and good alternating movements    REFLEX FUNCTION: Symmetric, no perverted reflex   STATION and GAIT Normal station, normal gait even with fused ankle               ASSESSMENT and  PLAN:      In summary, your patient, Sarina Rascon appears the same. There are no new lateralizing or localizing neurologic deficits or features to suggest a degenerative dementia like Alzheimer's disease. Obviously this was a concern of his because of several family members with the same problem.   Complicating matters is the hypercoagulable syndrome and the need for Coumadin. This certainly puts him at risk of future ischemic cerebrovascular disease and there are some punctate white matter changes that already correlate with occult ischemic injury though there is insufficient evidence for a vascular dementia. Likewise there is no need for him to continue the Aricept therapy. There is no need for additional neurologic investigation but I have suggested he take up with you further management of his underlying depression. He states that his antidepressant medications seem to be helping but haven't provided him the \"get up and go\" that he really wants and needs. Possibly a psychiatrist can help. In the meantime, I will remain available for questions or future neurologic needs but I see no reason for him to return on a regular basis and suggested he continue in your good management.

## 2017-11-09 ENCOUNTER — HOSPITAL ENCOUNTER (OUTPATIENT)
Dept: PAIN MANAGEMENT | Age: 67
Discharge: HOME | End: 2017-11-09
Payer: MEDICARE

## 2017-11-09 VITALS
TEMPERATURE: 98.2 F | OXYGEN SATURATION: 94 % | WEIGHT: 246 LBS | BODY MASS INDEX: 38.61 KG/M2 | RESPIRATION RATE: 16 BRPM | HEIGHT: 67 IN | HEART RATE: 59 BPM

## 2017-11-09 DIAGNOSIS — M25.571 CHRONIC PAIN OF RIGHT ANKLE: ICD-10-CM

## 2017-11-09 DIAGNOSIS — M12.571 TRAUMATIC ARTHRITIS OF ANKLE, RIGHT: Primary | ICD-10-CM

## 2017-11-09 DIAGNOSIS — H61.21 IMPACTED CERUMEN OF RIGHT EAR: ICD-10-CM

## 2017-11-09 DIAGNOSIS — Z98.1 S/P ANKLE FUSION: ICD-10-CM

## 2017-11-09 DIAGNOSIS — G89.29 CHRONIC PAIN OF RIGHT ANKLE: ICD-10-CM

## 2017-11-09 PROCEDURE — 99213 OFFICE O/P EST LOW 20 MIN: CPT

## 2017-11-09 ASSESSMENT — ENCOUNTER SYMPTOMS
GASTROINTESTINAL NEGATIVE: 1
SHORTNESS OF BREATH: 1

## 2017-11-09 NOTE — PROGRESS NOTES
pansinusitis     Allergic rhinitis     Asthma     Asthma     Dementia without behavioral disturbance     DVT (deep vein thrombosis) in pregnancy ([facility]     Gastroesophageal reflux     History of DVT (deep vein thrombosis) 8/27/2015    Hx of blood clots     Hyperlipidemia     Hyperlipidemia LDL goal < 100     Hypertension     Hypertension, benign     Major depression, chronic 8/27/2015    Osteoarthritis     Severe obesity (BMI 35.0-35.9 with comorbidity) ([facility] [address] 8/27/2015    Sleep apnea        Past Surgical History:   Procedure Laterality Date    ANKLE FUSION      CARPAL TUNNEL RELEASE      JOINT REPLACEMENT      SHOULDER ARTHROPLASTY      TONSILLECTOMY         No Known Allergies      Current Outpatient Prescriptions:     buPROPion (WELLBUTRIN SR) 150 MG extended release tablet, Take 1 tablet by mouth 2 times daily, Disp: 60 tablet, Rfl: 11    warfarin (COUMADIN) 5 MG tablet, TAKE 1 TO 2 TABLETS EVERY DAY, Disp: 180 tablet, Rfl: 3    atenolol (TENORMIN) 25 MG tablet, TAKE 1 TABLET TWICE DAILY, Disp: 180 tablet, Rfl: 3    omeprazole (PRILOSEC) 40 MG delayed release capsule, TAKE 1 CAPSULE EVERY DAY, Disp: 90 capsule, Rfl: 3    atorvastatin (LIPITOR) 40 MG tablet, TAKE 1 TABLET EVERY DAY, Disp: 90 tablet, Rfl: 3    citalopram (CELEXA) 40 MG tablet, TAKE 1 TABLET EVERY DAY, Disp: 90 tablet, Rfl: 3    etodolac (LODINE) 500 MG tablet, Take 1 tablet by mouth 2 times daily, Disp: 180 tablet, Rfl: 3    losartan (COZAAR) 100 MG tablet, TAKE 1 TABLET BY MOUTH DAILY, Disp: 90 tablet, Rfl: 3    donepezil (ARICEPT) 10 MG tablet, TAKE 1 TABLET EVERY DAY, Disp: 90 tablet, Rfl: 3    fluticasone (FLONASE) 50 MCG/ACT nasal spray, USE 2 SPRAYS IN EACH NOSTRIL EVERY DAY, Disp: 3 Bottle, Rfl: 3    SYMBICORT 160-4.5 MCG/ACT AERO, Inhale 2 puffs into the lungs daily, Disp: , Rfl:     B Complex Vitamins (VITAMIN B COMPLEX PO), Take 1 tablet by mouth daily, Disp: , Rfl:     Family History   Problem Relation Age [facility]   MDMA URINE Not Detected   Final 08/08/2017  2:40 PM [facility]   Meperidine Metab, Ur Not Detected   Final 08/08/2017  2:40 PM [facility]   Methadone, Urine Not Detected   Final 08/08/2017  2:40 PM [facility]   Methamphetamine, Urine Not Detected   Final 08/08/2017  2:40 PM [facility]   Methylphenidate Not Detected   Final 08/08/2017  2:40 PM [facility]   Midazolam, Urine Not Detected   Final 08/08/2017  2:40 PM [facility]   Morphine Urine Not Detected   Final 08/08/2017  2:40 PM [facility]   Norbuprenorphine, Urine Not Detected   Final 08/08/2017  2:40 PM [facility]   Nordiazepam, Urine Not Detected   Final 08/08/2017  2:40 PM [facility]   Norfentanyl, Urine Not Detected   Final 08/08/2017  2:40 PM [facility]   NORHYDROCODONE, URINE Not Detected   Final 08/08/2017  2:40 PM [facility]   Noroxycodone, Urine Not Detected   Final 08/08/2017  2:40 PM [facility]   NOROXYMORPHONE, URINE Not Detected   Final 08/08/2017  2:40 PM [facility]   Oxazepam, Urine Not Detected   Final 08/08/2017  2:40 PM [facility]   Oxycodone Urine Not Detected   Final 08/08/2017  2:40 PM [facility]   Oxymorphone, Urine Not Detected   Final 08/08/2017  2:40 PM [facility]   PCP, Urine Not Detected   Final 08/08/2017  2:40 PM [facility]   Phentermine, Ur Not Detected   Final 08/08/2017  2:40 PM [facility]   Propoxyphene, Urine Not Detected   Final 08/08/2017  2:40 PM [facility]   Tapentadol-O-Sulfate, Urine Not Detected   Final 08/08/2017  2:40 PM [facility]   Tapentadol, Urine Not Detected   Final 08/08/2017  2:40 PM [facility]   Temazepam, Urine Not Detected   Final 08/08/2017  2:40 PM [facility]   Tramadol, Urine Not Detected   Final 08/08/2017  2:40 PM [facility]   Zolpidem, Urine Not Detected   Final 08/08/2017  2:40 PM [facility]   Drugs Expected, Ur DENIES ANY MEDS   Final 08/08/2017  2:40 PM [facility]   Creatinine, Ur 361.3  20.0 - 400.0 mg/dL Final 08/08/2017  2:40 PM [facility]   Pain Mgt Drug Panel, Hi Res, Ur See Below   Final 08/08/2017  2:40 PM [facility]   (NOTE)   Methodology: Qualitative Enzyme Testing Performed By     Lab - Abbreviation Name Director Address Valid Date Range   [facility] Unknown Unknown 02/07/11 1936-Present   Lab and Collection     DRUG SCREEN, PAIN on 8/8/2017   Result History     DRUG SCREEN, PAIN on 8/12/2017       Assessment:    Problem List Items Addressed This Visit     Chronic pain of right ankle    S/P ankle fusion    Impacted cerumen of right ear      Other Visit Diagnoses     Traumatic arthritis of ankle, right    -  Primary          Treatment Plan:  DISCUSSION: Treatment options discussed with patient and all questions answered to patient's satisfaction.   He obtained 50% relief after right ankle injection, Pain level a 3, see as needed  He brought in old bottle of topical ointment for his right foot, will fax to [facility] to see if they can compound what he used before  TREATMENT OPTIONS:     See as  needed  Will fax script for topical oint

## 2018-05-02 ENCOUNTER — OFFICE VISIT (OUTPATIENT)
Dept: ORTHOPEDIC SURGERY | Age: 68
End: 2018-05-02
Payer: MEDICARE

## 2018-05-02 VITALS
BODY MASS INDEX: 37.67 KG/M2 | HEART RATE: 88 BPM | HEIGHT: 67 IN | DIASTOLIC BLOOD PRESSURE: 78 MMHG | WEIGHT: 240 LBS | SYSTOLIC BLOOD PRESSURE: 132 MMHG

## 2018-05-02 DIAGNOSIS — M54.10 RADICULAR LEG PAIN: ICD-10-CM

## 2018-05-02 DIAGNOSIS — M25.562 LEFT KNEE PAIN, UNSPECIFIED CHRONICITY: Primary | ICD-10-CM

## 2018-05-02 PROCEDURE — G8417 CALC BMI ABV UP PARAM F/U: HCPCS | Performed by: ORTHOPAEDIC SURGERY

## 2018-05-02 PROCEDURE — 3017F COLORECTAL CA SCREEN DOC REV: CPT | Performed by: ORTHOPAEDIC SURGERY

## 2018-05-02 PROCEDURE — 1036F TOBACCO NON-USER: CPT | Performed by: ORTHOPAEDIC SURGERY

## 2018-05-02 PROCEDURE — G8427 DOCREV CUR MEDS BY ELIG CLIN: HCPCS | Performed by: ORTHOPAEDIC SURGERY

## 2018-05-02 PROCEDURE — 1123F ACP DISCUSS/DSCN MKR DOCD: CPT | Performed by: ORTHOPAEDIC SURGERY

## 2018-05-02 PROCEDURE — 4040F PNEUMOC VAC/ADMIN/RCVD: CPT | Performed by: ORTHOPAEDIC SURGERY

## 2018-05-02 PROCEDURE — 99203 OFFICE O/P NEW LOW 30 MIN: CPT | Performed by: ORTHOPAEDIC SURGERY

## 2018-05-07 ENCOUNTER — HOSPITAL ENCOUNTER (OUTPATIENT)
Dept: MRI IMAGING | Facility: CLINIC | Age: 68
Discharge: HOME | End: 2018-05-09
Payer: MEDICARE

## 2018-05-07 DIAGNOSIS — M54.10 RADICULAR LEG PAIN: ICD-10-CM

## 2018-05-07 PROCEDURE — 72148 MRI LUMBAR SPINE W/O DYE: CPT

## 2018-05-08 ENCOUNTER — TELEPHONE (OUTPATIENT)
Dept: ORTHOPEDIC SURGERY | Age: 68
End: 2018-05-08

## 2018-05-08 DIAGNOSIS — M54.16 SPINAL STENOSIS OF LUMBAR REGION WITH RADICULOPATHY: Primary | ICD-10-CM

## 2018-05-08 DIAGNOSIS — M48.061 SPINAL STENOSIS OF LUMBAR REGION WITH RADICULOPATHY: Primary | ICD-10-CM

## 2018-05-14 ENCOUNTER — HOSPITAL ENCOUNTER (OUTPATIENT)
Dept: PHYSICAL THERAPY | Age: 68
Setting detail: THERAPIES SERIES
Discharge: HOME | End: 2018-05-14
Payer: MEDICARE

## 2018-06-07 ENCOUNTER — HOSPITAL ENCOUNTER (OUTPATIENT)
Dept: NUTRITION | Age: 68
Discharge: HOME | End: 2018-06-07
Payer: MEDICARE

## 2018-06-07 PROCEDURE — 97802 MEDICAL NUTRITION INDIV IN: CPT

## 2018-06-11 ENCOUNTER — HOSPITAL ENCOUNTER (OUTPATIENT)
Dept: PHYSICAL THERAPY | Age: 68
Setting detail: THERAPIES SERIES
Discharge: HOME | End: 2018-06-11
Payer: MEDICARE

## 2018-06-11 PROCEDURE — 97110 THERAPEUTIC EXERCISES: CPT

## 2018-06-11 PROCEDURE — G8978 MOBILITY CURRENT STATUS: HCPCS

## 2018-06-11 PROCEDURE — 97161 PT EVAL LOW COMPLEX 20 MIN: CPT

## 2018-06-11 PROCEDURE — G8979 MOBILITY GOAL STATUS: HCPCS

## 2018-06-14 ENCOUNTER — HOSPITAL ENCOUNTER (OUTPATIENT)
Dept: PHYSICAL THERAPY | Age: 68
Setting detail: THERAPIES SERIES
Discharge: HOME | End: 2018-06-14
Payer: MEDICARE

## 2018-06-14 PROCEDURE — 97113 AQUATIC THERAPY/EXERCISES: CPT

## 2018-06-18 ENCOUNTER — HOSPITAL ENCOUNTER (OUTPATIENT)
Dept: PHYSICAL THERAPY | Age: 68
Setting detail: THERAPIES SERIES
Discharge: HOME | End: 2018-06-18
Payer: MEDICARE

## 2018-06-18 PROCEDURE — 97113 AQUATIC THERAPY/EXERCISES: CPT

## 2018-06-21 ENCOUNTER — HOSPITAL ENCOUNTER (OUTPATIENT)
Dept: PHYSICAL THERAPY | Age: 68
Setting detail: THERAPIES SERIES
Discharge: HOME | End: 2018-06-21
Payer: MEDICARE

## 2018-06-21 PROCEDURE — 97113 AQUATIC THERAPY/EXERCISES: CPT

## 2018-06-25 ENCOUNTER — HOSPITAL ENCOUNTER (OUTPATIENT)
Dept: PHYSICAL THERAPY | Age: 68
Setting detail: THERAPIES SERIES
Discharge: HOME | End: 2018-06-25
Payer: MEDICARE

## 2018-06-25 PROCEDURE — 97113 AQUATIC THERAPY/EXERCISES: CPT

## 2018-06-28 ENCOUNTER — HOSPITAL ENCOUNTER (OUTPATIENT)
Dept: PHYSICAL THERAPY | Age: 68
Setting detail: THERAPIES SERIES
Discharge: HOME | End: 2018-06-28
Payer: MEDICARE

## 2018-06-28 PROCEDURE — 97113 AQUATIC THERAPY/EXERCISES: CPT

## 2018-07-02 ENCOUNTER — APPOINTMENT (OUTPATIENT)
Dept: PHYSICAL THERAPY | Age: 68
End: 2018-07-02
Payer: MEDICARE

## 2018-07-05 ENCOUNTER — APPOINTMENT (OUTPATIENT)
Dept: PHYSICAL THERAPY | Age: 68
End: 2018-07-05
Payer: MEDICARE

## 2018-07-09 ENCOUNTER — HOSPITAL ENCOUNTER (OUTPATIENT)
Dept: PHYSICAL THERAPY | Age: 68
Setting detail: THERAPIES SERIES
Discharge: HOME | End: 2018-07-09
Payer: MEDICARE

## 2018-07-09 PROCEDURE — 97113 AQUATIC THERAPY/EXERCISES: CPT

## 2018-07-09 NOTE — PROGRESS NOTES
Physical Therapy    [address]  Outpatient Physical Therapy   [address]   Phone: [phone]   Fax: [phone]    Physical Therapy Daily Treatment Note    Date:  2018  Patient Name:  Satnam Mail    :  1950  MRN: [id number]  Physician: Rubén Serrano MD     Insurance: [employer]  Medical Diagnosis: B07.530 (ICD-10-CM) - Spinal stenosis, lumbar region without neurogenic claudication     Rehab Codes: Dec ROM, MM Weakness  Onset Date:             Next 's appt.: TBD  Visit# / total visits: 7/10  Cancels/No Shows: 0/0    Subjective:  Saw Dr. Jamila Sanz this AM- patient reports that he is to schedule surgery in the next few weeks to address his spinal stenosis. States recently, it takes him about an hour to get up and moving each morning due to pain, where it used to take him about fifteen minutes. Was on vacation for about a week, and was able to complete a portion of his aquatic program 2-3 times. Pain:  [x] Yes  [] No Location: Lower back and pain from the \"waist down\" with most severe pain being around his waist/belt line   Pain Rating: (0-10 scale) 2-3/10 upon arrival   Pain altered Tx:  [x] No  [] Yes  Action:    Comments:  Patient requires frequent VC's to stay on task as patient tends to do his own thing. Pt wore shoe on right foot due to leg length difference. Objective:   [facility] Exercise Log  Aquatic, Hip & DLS Program- Phase 1    Date 18   Visit # 3/10 4/10 5/18 6/18 7/18   Walk F/L/R 2 laps ea 2 laps ea 2 Laps 2 Laps 2 laps ea   Marching 10x 10x 15x 2 Laps 2 laps   Squats 10x5\" 10x5\" 10x5\" 15x5\" 15x5\"   Step-Ups F/L  Low fwd 10x Low 10x Low 10x Low 10x   Heel-toe raises 10x 10x 15x 15x 15x   SLR F/L/R 10x ea  Low box 10x ea  Low box 10x 15x 15x ea   Knee/Flex/Ext 10x 10x 10x 15x 15x   F/L Lunges        Kickboard Ex.  Medium Large Lg Lg Large   Iso

## 2018-07-10 ENCOUNTER — HOSPITAL ENCOUNTER (OUTPATIENT)
Dept: PHYSICAL THERAPY | Age: 68
Setting detail: THERAPIES SERIES
Discharge: HOME | End: 2018-07-10
Payer: MEDICARE

## 2018-07-10 PROCEDURE — 97113 AQUATIC THERAPY/EXERCISES: CPT

## 2018-07-10 NOTE — PROGRESS NOTES
cleared             Electronically signed by: Mark Mooney PT    If you have any questions or concerns, please don't hesitate to call. Thank you for your referral.    Physician Signature:________________________________Date:__________________  By signing above or cosigning this note, I have reviewed this plan of care and certify a need for medically necessary rehabilitation services.      *PLEASE SIGN ABOVE AND FAX BACK ALL PAGES*

## 2018-08-14 ENCOUNTER — HOSPITAL ENCOUNTER (OUTPATIENT)
Dept: PHYSICAL THERAPY | Age: 68
Setting detail: THERAPIES SERIES
Discharge: HOME | End: 2018-08-14
Payer: MEDICARE

## 2018-08-14 PROCEDURE — G8979 MOBILITY GOAL STATUS: HCPCS

## 2018-08-14 PROCEDURE — 97161 PT EVAL LOW COMPLEX 20 MIN: CPT

## 2018-08-14 PROCEDURE — G8978 MOBILITY CURRENT STATUS: HCPCS

## 2018-08-14 ASSESSMENT — PAIN SCALES - QUEBEC BACK PAIN DISABILITY SCALE
TOTAL SCORE: 22
PUT ON SOCKS OR PANYHOSE: 1
WALK SEVERAL KILOMETERS  OR MILES: 4
BEND OVER TO CLEAN THE BATHTUB: 1
GET OUT OF BED: 0
REACH UP TO HIGH SHELVES: 0
LIFT AND CARRY A HEAVY SUITCASE: 2
CARRY TWO BAGS OF GROCERIES: 0
QUEBEC CMS MODIFIER: CJ
THROW A BALL: 1
RIDE IN A CAR: 0
PULL OR PUSH HEAVY DOORS: 1
QUEBEC DISABILITY INDEX: 20-39%
MAKE YOUR BED: 0
STAND UP FOR 20 TO 30 MINUTES: 2
CLIMB ONE FLIGHT OF STAIRS: 1
SLEEP THROUGH THE NIGHT: 1
SIT IN A CHAIR FOR SEVERAL HOURS: 0
TURN OVER IN BED: 0
TAKE FOOD OUT OF THE REFRIGERATOR: 0
MOVE A CHAIR: 1
RUN ONE BLOCK OR 100M: 5
WALK A FEW BLOCKS OR 300 TO 400M: 2

## 2018-08-17 ENCOUNTER — HOSPITAL ENCOUNTER (OUTPATIENT)
Dept: PHYSICAL THERAPY | Age: 68
Setting detail: THERAPIES SERIES
Discharge: HOME | End: 2018-08-17
Payer: MEDICARE

## 2018-08-17 PROCEDURE — 97113 AQUATIC THERAPY/EXERCISES: CPT

## 2018-08-17 NOTE — FLOWSHEET NOTE
[]  Manual Therapy     []  Ther Activities     [x]  Aquatics 30 2   []  Vasocompression     []  Other     Total Treatment time 30 2       Assessment: [x] Progressing toward goals. [] No change. [] Other:    STG: (to be met in 10 treatments)  1. ? Pain: 0/10  2. ? ROM:  3. ? Strength: Patient able to complete ADL's without fatigue  4. ? Function:[place] Pain Scale =7  5. Independent with Home Exercise Programs  6. Demonstrate Knowledge of fall prevention        Patient goals: To rebuild strength and balance    Pt. Education:  [] Yes  [x] No  [] Reviewed Prior HEP/Ed  Method of Education: [] Verbal  [] Demo  [] Written  Comprehension of Education:  [] Verbalizes understanding. [] Demonstrates understanding. [] Needs review. [] Demonstrates/verbalizes HEP/Ed previously given. Plan: [] Continue per plan of care.    [] Other:      Time In: 9:30 am            Time Out: 10:00am     Electronically signed by:  Dangelo Sylvester PTA

## 2018-08-21 ENCOUNTER — HOSPITAL ENCOUNTER (OUTPATIENT)
Dept: PHYSICAL THERAPY | Age: 68
Setting detail: THERAPIES SERIES
Discharge: HOME | End: 2018-08-21
Payer: MEDICARE

## 2018-08-21 PROCEDURE — 97113 AQUATIC THERAPY/EXERCISES: CPT

## 2018-08-21 NOTE — FLOWSHEET NOTE
White noodle 1 Noodle         Deep water            Cycling      Add        Hang 3'  5'                                     Cool down 2 Laps  2 Laps                       Pain Rating 0  1-2            Specific Instructions for next treatment: Progress Deep water aerobic exercise. Treatment Charges: Mins Units   []  Modalities     []  Ther Exercise     []  Manual Therapy     []  Ther Activities     [x]  Aquatics 35 2   []  Vasocompression     []  Other     Total Treatment time 35 2       Assessment: [x] Progressing toward goals. Added KB Paddling and increased reps of all exercise as shown above. No complaints of increased pain today    [] No change. [] Other:    STG: (to be met in 10 treatments)  1. ? Pain: 0/10  2. ? ROM:  3. ? Strength: Patient able to complete ADL's without fatigue  4. ? Function:[place] Pain Scale =7  5. Independent with Home Exercise Programs  6. Demonstrate Knowledge of fall prevention        Patient goals: To rebuild strength and balance    Pt. Education:  [] Yes  [x] No  [] Reviewed Prior HEP/Ed  Method of Education: [] Verbal  [] Demo  [] Written  Comprehension of Education:  [] Verbalizes understanding. [] Demonstrates understanding. [] Needs review. [] Demonstrates/verbalizes HEP/Ed previously given. Plan: [] Continue per plan of care.    [] Other:      Time In: 9:25 am            Time Out: 10:07 am     Electronically signed by:  Yoana Thomas PTA

## 2018-08-23 ENCOUNTER — HOSPITAL ENCOUNTER (OUTPATIENT)
Dept: PHYSICAL THERAPY | Age: 68
Setting detail: THERAPIES SERIES
Discharge: HOME | End: 2018-08-23
Payer: MEDICARE

## 2018-08-23 PROCEDURE — 97113 AQUATIC THERAPY/EXERCISES: CPT

## 2018-08-23 PROCEDURE — 97110 THERAPEUTIC EXERCISES: CPT

## 2018-08-28 ENCOUNTER — HOSPITAL ENCOUNTER (OUTPATIENT)
Dept: PHYSICAL THERAPY | Age: 68
Setting detail: THERAPIES SERIES
Discharge: HOME | End: 2018-08-28
Payer: MEDICARE

## 2018-08-28 PROCEDURE — 97113 AQUATIC THERAPY/EXERCISES: CPT

## 2018-08-28 NOTE — FLOWSHEET NOTE
Hang 3'  5' 5'  5'                                 Cool down 2 Laps  2 Laps 2 Laps  2 Laps                   Pain Rating 0  1-2 0   0        Specific Instructions for next treatment: Progress Deep water aerobic exercise. Treatment Charges: Mins Units   []  Modalities     []  Ther Exercise     []  Manual Therapy     []  Ther Activities     [x]  Aquatics 30 2   []  Vasocompression     []  Other     Total Treatment time 30 2       Assessment: [x] Progressing toward goals. [] No change. [] Other:    STG: (to be met in 10 treatments)  1. ? Pain: 0/10  2. ? ROM:  3. ? Strength: Patient able to complete ADL's without fatigue  4. ? Function:[place] Pain Scale =7  5. Independent with Home Exercise Programs  6. Demonstrate Knowledge of fall prevention        Patient goals: To rebuild strength and balance    Pt. Education:  [] Yes  [x] No  [] Reviewed Prior HEP/Ed  Method of Education: [] Verbal  [] Demo  [] Written  Comprehension of Education:  [] Verbalizes understanding. [] Demonstrates understanding. [] Needs review. [] Demonstrates/verbalizes HEP/Ed previously given. Plan: [x] Continue per plan of care.    [] Other:      Time In: 0930 am            Time Out: 1005     Electronically signed by:  Adriano Schaefer PTA

## 2018-08-30 ENCOUNTER — HOSPITAL ENCOUNTER (OUTPATIENT)
Dept: PHYSICAL THERAPY | Age: 68
Setting detail: THERAPIES SERIES
Discharge: HOME | End: 2018-08-30
Payer: MEDICARE

## 2018-08-30 PROCEDURE — 97113 AQUATIC THERAPY/EXERCISES: CPT

## 2018-08-30 NOTE — FLOWSHEET NOTE
[x] [address]  Outpatient Physical Therapy  [address] [facility]         Phone: [phone]       Fax: [phone]        Physical Therapy Daily Treatment Note    Date:  2018  Patient Name:  Alka Bonilla    :  1950  MRN: [id number]  Physician: Kingsley Egan MD     Insurance:   Diagnosis: Spinal stenosis, lumbar region without neurogenic claudication           Onset Date: 18   Next Dr. Debbie Flores: 18  Visit# / total visits: 610 Cancels/No Shows: 0/0    Subjective:  Denies pain upon arrival to [facility]. Following warm-up laps in pool, patient with complaints of 3/10 pain in the left side of his lower back and down the left LE to his knee. Pain:  [] Yes  [x] No Location: N/A Pain Rating: (0-10 scale) 0/10 upon arrival; 3/10 after warm-ups in pool. Pain altered Tx:  [x] No  [] Yes  Action    Objective:  [facility] Exercise Log  Aquatic, Hip & DLS Program- Phase 1      Date of Eval:  18                              Primary PT: Vic   Diagnosis: Spinal stenosis, lumbar region without neurogenic claudication           Things to Focus On (goals):   Surgical Precautions:  Medical Precautions: *Very [place]*  [] C-9 dates  [] Occ Med   [] Medicare         Date 18   Visit # 2/8  3/8 4/8   5/10  6/10   Walk F/L/R 2 laps  2 Laps 2 Laps   2 Laps  2 laps ea   Marching 10x 15x 2 Laps  2 Laps  2 laps   Squats 10x5\" 15x5\" 15x5\" 15x5\" 15x5\"    Step-Ups F/L Low 10x  Low 15x Tall 10x Tall 15x Tall 15x ea    Heel-toe raises 10x 15x 15x 15x 15x    SLR F/L/R 10x 15x 15x +R 15x  15x ea   Knee/Flex/Ext 10x  15x 15x 15x  15x   F/L Lunges            Kickboard Ex.  Med Med Med Med  Medium   Iso Abd. 10x5\" 15x5\" 15x5\" 15x5\"  15x5\"   Push-pull 10x  15x 15x 15x  15x   Paddling    15x 15x 15x  15x                 Balance                                         Stretches             Achllies 2x20\" 2x20\" 2x20\"  2x20\"

## 2018-09-04 ENCOUNTER — HOSPITAL ENCOUNTER (OUTPATIENT)
Dept: PHYSICAL THERAPY | Age: 68
Setting detail: THERAPIES SERIES
Discharge: HOME | End: 2018-09-04
Payer: MEDICARE

## 2018-09-04 PROCEDURE — G8979 MOBILITY GOAL STATUS: HCPCS

## 2018-09-04 PROCEDURE — G8978 MOBILITY CURRENT STATUS: HCPCS

## 2018-09-04 PROCEDURE — 97113 AQUATIC THERAPY/EXERCISES: CPT

## 2018-09-04 ASSESSMENT — PAIN SCALES - QUEBEC BACK PAIN DISABILITY SCALE
BEND OVER TO CLEAN THE BATHTUB: 0
QUEBEC DISABILITY INDEX: 1-19%
RIDE IN A CAR: 0
THROW A BALL: 0
PULL OR PUSH HEAVY DOORS: 0
SIT IN A CHAIR FOR SEVERAL HOURS: 0
STAND UP FOR 20 TO 30 MINUTES: 2
RUN ONE BLOCK OR 100M: 4
GET OUT OF BED: 0
QUEBEC CMS MODIFIER: CI
TURN OVER IN BED: 0
WALK SEVERAL KILOMETERS  OR MILES: 4
MAKE YOUR BED: 0
TOTAL SCORE: 13
PUT ON SOCKS OR PANYHOSE: 0
WALK A FEW BLOCKS OR 300 TO 400M: 2
CLIMB ONE FLIGHT OF STAIRS: 0
REACH UP TO HIGH SHELVES: 0
TAKE FOOD OUT OF THE REFRIGERATOR: 0
LIFT AND CARRY A HEAVY SUITCASE: 1
CARRY TWO BAGS OF GROCERIES: 0
SLEEP THROUGH THE NIGHT: 0
MOVE A CHAIR: 0

## 2018-09-04 NOTE — FLOWSHEET NOTE
Rating  0  0  3 after amb. 0   3 After Amb       Specific Instructions for next treatment: Progress Deep water aerobic exercise. Treatment Charges: Mins Units   []  Modalities     []  Ther Exercise     []  Manual Therapy     []  Ther Activities     [x]  Aquatics 30 2   []  Vasocompression     []  Other     Total Treatment time 30 2       Assessment: [x] Progressing toward goals. Increased KB size, added retro kicks and  lunges to tall step today to good tolerance    [] No change. [] Other:    STG: (to be met in 10 treatments)  1. ? Pain: 0/10  2. ? ROM:  3. ? Strength: Patient able to complete ADL's without fatigue  4. ? Function:[place] Pain Scale =7  5. Independent with Home Exercise Programs  6. Demonstrate Knowledge of fall prevention        Patient goals: To rebuild strength and balance    Pt. Education:  [x] Yes  [] No  [] Reviewed Prior HEP/Ed  Method of Education: [x] Verbal  [x] Demo  [] Written  Comprehension of Education:  [x] Verbalizes understanding. [x] Demonstrates understanding. [] Needs review. [] Demonstrates/verbalizes HEP/Ed previously given. Plan: [x] Continue per plan of care.    [x] Other: Progress to land/pool       Time In: 8231            Time Out:  1015    Electronically signed by Priya Bearden PTA on 9/4/2018 at 9:04 AM

## 2018-09-07 ENCOUNTER — HOSPITAL ENCOUNTER (OUTPATIENT)
Dept: PHYSICAL THERAPY | Age: 68
Setting detail: THERAPIES SERIES
Discharge: HOME | End: 2018-09-07
Payer: MEDICARE

## 2018-09-07 PROCEDURE — 97113 AQUATIC THERAPY/EXERCISES: CPT

## 2018-09-07 NOTE — FLOWSHEET NOTE
2'   Jacks 2' 2' 2' 2'   Hang  5'  5' 5' 5'                       Cool down  2 Laps  2 laps 2 Laps 2 Laps   Pain Rating  0  0  3 after amb. 0   3 After Amb 0  2 After Amb      Specific Instructions for next treatment: Progress Deep water aerobic exercise. Treatment Charges: Mins Units   []  Modalities     []  Ther Exercise     []  Manual Therapy     []  Ther Activities     [x]  Aquatics 38 3   []  Vasocompression     []  Other     Total Treatment time 38 3       Assessment: [x] Progressing toward goals. [] No change. [] Other:    STG: (to be met in 10 treatments)  1. ? Pain: 0/10  2. ? ROM:  3. ? Strength: Patient able to complete ADL's without fatigue  4. ? Function:[place] Pain Scale =7  5. Independent with Home Exercise Programs  6. Demonstrate Knowledge of fall prevention        Patient goals: To rebuild strength and balance    Pt. Education:  [x] Yes  [] No  [] Reviewed Prior HEP/Ed  Method of Education: [x] Verbal  [x] Demo  [] Written  Comprehension of Education:  [x] Verbalizes understanding. [x] Demonstrates understanding. [] Needs review. [] Demonstrates/verbalizes HEP/Ed previously given. Plan: [x] Continue per plan of care.    [x] Other: Progress to land/pool       Time In: 4030            Time Out:  0940    Electronically signed by John Goldsmith PTA on 9/7/2018 at 8:57 AM

## 2018-09-11 ENCOUNTER — HOSPITAL ENCOUNTER (OUTPATIENT)
Dept: PHYSICAL THERAPY | Age: 68
Setting detail: THERAPIES SERIES
Discharge: HOME | End: 2018-09-11
Payer: MEDICARE

## 2018-09-11 PROCEDURE — 97113 AQUATIC THERAPY/EXERCISES: CPT

## 2018-09-13 ENCOUNTER — HOSPITAL ENCOUNTER (OUTPATIENT)
Dept: PHYSICAL THERAPY | Age: 68
Setting detail: THERAPIES SERIES
Discharge: HOME | End: 2018-09-13
Payer: MEDICARE

## 2018-09-13 PROCEDURE — 97113 AQUATIC THERAPY/EXERCISES: CPT

## 2018-09-13 NOTE — FLOWSHEET NOTE
2'   [facility]  2' 2' 2'   Allison Eddy 2' 2' 2' 2'   Hang 5' 5' 5' 5'                   Cool down 2 Laps 2 Laps 2 Laps 2 Laps   Pain Rating 0   3 After Amb 0  2 After Amb 0 0      Specific Instructions for next treatment:       Treatment Charges: Mins Units   []  Modalities     []  Ther Exercise     []  Manual Therapy     []  Ther Activities     [x]  Aquatics 38 3   []  Vasocompression     []  Other     Total Treatment time 38 3       Assessment: [x] Progressing toward goals. added Small buoyancy cuffs this visit    [] No change. [] Other:    STG: (to be met in 10 treatments)  1. ? Pain: 0/10  2. ? ROM:  3. ? Strength: Patient able to complete ADL's without fatigue  4. ? Function:[place] Pain Scale =7  5. Independent with Home Exercise Programs  6. Demonstrate Knowledge of fall prevention        Patient goals: To rebuild strength and balance    Pt. Education:  [x] Yes  [] No  [] Reviewed Prior HEP/Ed  Method of Education: [x] Verbal  [x] Demo  [] Written  Comprehension of Education:  [x] Verbalizes understanding. [x] Demonstrates understanding. [] Needs review. [] Demonstrates/verbalizes HEP/Ed previously given. Plan: [x] Continue per plan of care.    [x] Other: Progress to land/pool       Time In: 1941          Time Out:  1040    Electronically signed by Patricia Dumont PTA on 9/13/2018 at 9:45 AM

## 2018-09-18 ENCOUNTER — HOSPITAL ENCOUNTER (OUTPATIENT)
Dept: PHYSICAL THERAPY | Age: 68
Setting detail: THERAPIES SERIES
Discharge: HOME | End: 2018-09-18
Payer: MEDICARE

## 2018-09-18 PROCEDURE — 97113 AQUATIC THERAPY/EXERCISES: CPT

## 2018-09-20 ENCOUNTER — HOSPITAL ENCOUNTER (OUTPATIENT)
Dept: PHYSICAL THERAPY | Age: 68
Setting detail: THERAPIES SERIES
Discharge: HOME | End: 2018-09-20
Payer: MEDICARE

## 2018-09-20 PROCEDURE — 97113 AQUATIC THERAPY/EXERCISES: CPT

## 2018-09-20 NOTE — FLOWSHEET NOTE
[x] [address]  Outpatient Physical Therapy  [address] [facility]. [address]         Phone: [phone]       Fax: [phone]        Physical Therapy Daily Treatment Note    Date:  2018  Patient Name:  Shivani Turcios    :  1950  MRN: [id number]  Physician: Becki Arellano MD     Insurance:   Diagnosis: Spinal stenosis, lumbar region without neurogenic claudication           Onset Date: 18   Next Dr. Jane Mendess: 18  Visit# / total visits:  Cancels/No Shows: 0/0    Subjective:    Reports no pain this morning     Pain:  [x] Yes  [] No Location: Left Lumbar back   Pain Rating: (0-10 scale) 0/10 upon arrival  Pain altered Tx:  [x] No  [] Yes  Action    Objective:  [facility] Exercise Log  Aquatic, Hip & DLS Program- Phase 1      Date of Eval:  18                              Primary PT: Vivianosmar Wayne   Diagnosis: Spinal stenosis, lumbar region without neurogenic claudication           Things to Focus On (goals):   Surgical Precautions:  Medical Precautions: *Very Tanana*  [] C-9 dates  [] Occ Med   [] Medicare         Date 18   Visit # 9/10 10/10 11/10 12/12     Sm Cuffs Sm Cuffs Sm Cuffs   Walk F/L/R 2 Laps 2 Laps 2 Laps 2 Laps   Marching 2 Laps 2 Laps 2 Laps 2 Laps   Squats 15x5\" 15x5\" 15x5\" 15x5\"   Step-Ups F/L Tall 15x Tall 15x Tall 15x Tall 15x   Heel-toe raises 15x 15x 15x 15x   SLR F/L/R 15x 15x 15x 15x   Knee/Flex/Ext 15x 15x 15x 15x   F/L Lunges Tall 10x Tall 10x Tall 15x Tall 15x   Kickboard Ex.  Lg Lg  Lg Lg   Iso Abd. 15x5\" 15x5\" 15x5\" 15x5\"   Push-pull 15x 15x 15x 15x   Paddling 15x 15x 15x 15x           UE exercises       Horiz Abd 10x 15x 15x 15x   Wipers    10x   Alt flex/Ext 10x 15x 15x 15x   Press downs    10x   Abd/Add 10x 15x 15x 15x                   Stretches       Achllies 2x20\" 2x20\" 2x20\" 2x20\"   Hamstring 2x20\" 2x20\" 2x20\" 2x20\"          Deep water 1 Noodle 1 Noodle 1 Noodle 1 Noodle   Cycling  2'

## 2018-10-19 ENCOUNTER — HOSPITAL ENCOUNTER (OUTPATIENT)
Dept: PHARMACY | Age: 68
Setting detail: THERAPIES SERIES
Discharge: HOME | End: 2018-10-19
Payer: MEDICARE

## 2018-10-19 DIAGNOSIS — I26.99 PE (PULMONARY THROMBOEMBOLISM) (HCC): ICD-10-CM

## 2018-10-19 LAB
INR BLD: 2.7
PROTIME: 32.9 SECONDS

## 2018-10-19 PROCEDURE — 99212 OFFICE O/P EST SF 10 MIN: CPT

## 2018-10-19 PROCEDURE — 85610 PROTHROMBIN TIME: CPT

## 2018-11-16 ENCOUNTER — HOSPITAL ENCOUNTER (OUTPATIENT)
Dept: PHARMACY | Age: 68
Setting detail: THERAPIES SERIES
Discharge: HOME | End: 2018-11-16
Payer: MEDICARE

## 2018-11-16 DIAGNOSIS — I26.99 PE (PULMONARY THROMBOEMBOLISM) (HCC): ICD-10-CM

## 2018-11-16 LAB
INR BLD: 2.5
PROTIME: 30.3 SECONDS

## 2018-11-16 PROCEDURE — 99211 OFF/OP EST MAY X REQ PHY/QHP: CPT

## 2018-11-16 PROCEDURE — 85610 PROTHROMBIN TIME: CPT

## 2018-12-14 ENCOUNTER — HOSPITAL ENCOUNTER (OUTPATIENT)
Dept: PHARMACY | Age: 68
Setting detail: THERAPIES SERIES
Discharge: HOME | End: 2018-12-14
Payer: MEDICARE

## 2018-12-14 ENCOUNTER — TELEPHONE (OUTPATIENT)
Dept: PHARMACY | Age: 68
End: 2018-12-14

## 2018-12-14 DIAGNOSIS — I26.99 PE (PULMONARY THROMBOEMBOLISM) (HCC): ICD-10-CM

## 2018-12-14 LAB
INR BLD: 4.4
PROTIME: 52.6 SECONDS

## 2018-12-14 PROCEDURE — 99213 OFFICE O/P EST LOW 20 MIN: CPT

## 2018-12-14 PROCEDURE — 85610 PROTHROMBIN TIME: CPT

## 2018-12-14 RX ORDER — WARFARIN SODIUM 5 MG/1
TABLET ORAL
Qty: 200 TABLET | Refills: 2 | Status: SHIPPED | OUTPATIENT
Start: 2018-12-14

## 2018-12-14 RX ORDER — ACETAMINOPHEN 500 MG
1000 TABLET ORAL 3 TIMES DAILY PRN
COMMUNITY

## 2018-12-14 RX ORDER — ETODOLAC 500 MG/1
500 TABLET, FILM COATED ORAL 2 TIMES DAILY
COMMUNITY
End: 2021-01-11

## 2018-12-20 ENCOUNTER — HOSPITAL ENCOUNTER (OUTPATIENT)
Dept: PHARMACY | Age: 68
Setting detail: THERAPIES SERIES
Discharge: HOME | End: 2018-12-20
Payer: MEDICARE

## 2018-12-20 DIAGNOSIS — I26.99 PE (PULMONARY THROMBOEMBOLISM) (HCC): ICD-10-CM

## 2018-12-20 LAB
INR BLD: 2.5
PROTIME: 29.8 SECONDS

## 2018-12-20 PROCEDURE — 85610 PROTHROMBIN TIME: CPT

## 2018-12-20 PROCEDURE — 99211 OFF/OP EST MAY X REQ PHY/QHP: CPT

## 2018-12-21 ENCOUNTER — HOSPITAL ENCOUNTER (OUTPATIENT)
Dept: PHYSICAL THERAPY | Age: 68
Setting detail: THERAPIES SERIES
Discharge: HOME | End: 2018-12-21
Payer: MEDICARE

## 2018-12-21 PROCEDURE — G8981 BODY POS CURRENT STATUS: HCPCS

## 2018-12-21 PROCEDURE — G8982 BODY POS GOAL STATUS: HCPCS

## 2018-12-21 PROCEDURE — 97161 PT EVAL LOW COMPLEX 20 MIN: CPT

## 2018-12-21 PROCEDURE — 97110 THERAPEUTIC EXERCISES: CPT

## 2018-12-21 ASSESSMENT — PAIN SCALES - GENERAL: PAINLEVEL_OUTOF10: 3

## 2018-12-21 ASSESSMENT — PAIN DESCRIPTION - ONSET: ONSET: ON-GOING

## 2018-12-21 ASSESSMENT — PAIN DESCRIPTION - ORIENTATION: ORIENTATION: LEFT;LOWER

## 2018-12-21 ASSESSMENT — PAIN DESCRIPTION - DESCRIPTORS: DESCRIPTORS: ACHING;CONSTANT

## 2018-12-21 ASSESSMENT — PAIN DESCRIPTION - FREQUENCY: FREQUENCY: CONTINUOUS

## 2018-12-21 ASSESSMENT — PAIN DESCRIPTION - LOCATION: LOCATION: BACK

## 2018-12-21 ASSESSMENT — PAIN DESCRIPTION - PAIN TYPE: TYPE: CHRONIC PAIN

## 2018-12-21 NOTE — PROGRESS NOTES
Current Status (): At least 60 percent but less than 80 percent impaired, limited or restricted  Changing and Maintaining Body Position Goal Status (): At least 20 percent but less than 40 percent impaired, limited or restricted    OutComes Score   TUG 11.54 sec. No device                           Goals  Short term goals  Time Frame for Short term goals: 9 visits  Short term goal 1: Proper demonstration of HEP given today  Short term goal 2: Stand more than 5 min with proper posture and minimal pain  Long term goals  Time Frame for Long term goals : 18 visits  Long term goal 1: Optimal < 6  Long term goal 2:  Indep HEP for lumbar stabilization       Therapy Time   Individual Concurrent Group Co-treatment   Time In 0910         Time Out 1000         Minutes 50               Transfer POC to Valeri Argueta, PT

## 2018-12-26 ENCOUNTER — HOSPITAL ENCOUNTER (OUTPATIENT)
Dept: PHYSICAL THERAPY | Age: 68
Setting detail: THERAPIES SERIES
Discharge: HOME | End: 2018-12-26
Payer: MEDICARE

## 2018-12-26 PROCEDURE — 97113 AQUATIC THERAPY/EXERCISES: CPT

## 2018-12-26 ASSESSMENT — PAIN DESCRIPTION - LOCATION: LOCATION: BACK

## 2018-12-26 ASSESSMENT — PAIN SCALES - GENERAL: PAINLEVEL_OUTOF10: 5

## 2018-12-26 ASSESSMENT — PAIN DESCRIPTION - PAIN TYPE: TYPE: CHRONIC PAIN

## 2018-12-26 ASSESSMENT — PAIN DESCRIPTION - DESCRIPTORS: DESCRIPTORS: ACHING;CONSTANT

## 2018-12-26 ASSESSMENT — PAIN DESCRIPTION - ORIENTATION: ORIENTATION: LEFT;LOWER

## 2018-12-26 ASSESSMENT — PAIN DESCRIPTION - ONSET: ONSET: ON-GOING

## 2018-12-26 ASSESSMENT — PAIN DESCRIPTION - FREQUENCY: FREQUENCY: CONTINUOUS

## 2019-01-02 ENCOUNTER — HOSPITAL ENCOUNTER (OUTPATIENT)
Dept: PHYSICAL THERAPY | Age: 69
Setting detail: THERAPIES SERIES
Discharge: HOME | End: 2019-01-02
Payer: MEDICARE

## 2019-01-02 PROCEDURE — 97113 AQUATIC THERAPY/EXERCISES: CPT

## 2019-01-02 ASSESSMENT — PAIN DESCRIPTION - PAIN TYPE: TYPE: CHRONIC PAIN

## 2019-01-02 ASSESSMENT — PAIN DESCRIPTION - ORIENTATION: ORIENTATION: LOWER

## 2019-01-02 ASSESSMENT — PAIN DESCRIPTION - LOCATION: LOCATION: BACK

## 2019-01-02 ASSESSMENT — PAIN SCALES - GENERAL: PAINLEVEL_OUTOF10: 3

## 2019-01-03 ENCOUNTER — HOSPITAL ENCOUNTER (OUTPATIENT)
Dept: PHARMACY | Age: 69
Setting detail: THERAPIES SERIES
Discharge: HOME | End: 2019-01-03
Payer: MEDICARE

## 2019-01-03 DIAGNOSIS — I26.99 PE (PULMONARY THROMBOEMBOLISM) (HCC): ICD-10-CM

## 2019-01-03 LAB
INR BLD: 2.7
PROTIME: 32.8 SECONDS

## 2019-01-03 PROCEDURE — 85610 PROTHROMBIN TIME: CPT

## 2019-01-03 PROCEDURE — 99211 OFF/OP EST MAY X REQ PHY/QHP: CPT

## 2019-01-04 ENCOUNTER — APPOINTMENT (OUTPATIENT)
Dept: PHYSICAL THERAPY | Age: 69
End: 2019-01-04
Payer: MEDICARE

## 2019-01-07 ENCOUNTER — HOSPITAL ENCOUNTER (OUTPATIENT)
Dept: PHYSICAL THERAPY | Age: 69
Setting detail: THERAPIES SERIES
Discharge: HOME | End: 2019-01-07
Payer: MEDICARE

## 2019-01-07 PROCEDURE — 97113 AQUATIC THERAPY/EXERCISES: CPT

## 2019-01-07 ASSESSMENT — PAIN SCALES - GENERAL: PAINLEVEL_OUTOF10: 5

## 2019-01-07 ASSESSMENT — PAIN DESCRIPTION - PAIN TYPE: TYPE: CHRONIC PAIN

## 2019-01-07 ASSESSMENT — PAIN DESCRIPTION - ORIENTATION: ORIENTATION: LOWER

## 2019-01-07 ASSESSMENT — PAIN DESCRIPTION - LOCATION: LOCATION: BACK

## 2019-01-09 ENCOUNTER — HOSPITAL ENCOUNTER (OUTPATIENT)
Dept: PHYSICAL THERAPY | Age: 69
Setting detail: THERAPIES SERIES
Discharge: HOME | End: 2019-01-09
Payer: MEDICARE

## 2019-01-09 PROCEDURE — 97113 AQUATIC THERAPY/EXERCISES: CPT

## 2019-01-09 ASSESSMENT — PAIN DESCRIPTION - LOCATION: LOCATION: BACK

## 2019-01-09 ASSESSMENT — PAIN DESCRIPTION - PAIN TYPE: TYPE: CHRONIC PAIN

## 2019-01-09 ASSESSMENT — PAIN DESCRIPTION - ORIENTATION: ORIENTATION: LOWER

## 2019-01-09 ASSESSMENT — PAIN SCALES - GENERAL: PAINLEVEL_OUTOF10: 3

## 2019-01-10 ENCOUNTER — HOSPITAL ENCOUNTER (OUTPATIENT)
Dept: PHYSICAL THERAPY | Age: 69
Setting detail: THERAPIES SERIES
Discharge: HOME | End: 2019-01-10
Payer: MEDICARE

## 2019-01-10 PROCEDURE — 97113 AQUATIC THERAPY/EXERCISES: CPT

## 2019-01-10 ASSESSMENT — PAIN SCALES - GENERAL: PAINLEVEL_OUTOF10: 1

## 2019-01-10 ASSESSMENT — PAIN DESCRIPTION - ORIENTATION: ORIENTATION: LOWER

## 2019-01-10 ASSESSMENT — PAIN DESCRIPTION - PAIN TYPE: TYPE: CHRONIC PAIN

## 2019-01-10 ASSESSMENT — PAIN DESCRIPTION - LOCATION: LOCATION: BACK

## 2019-01-15 ENCOUNTER — HOSPITAL ENCOUNTER (OUTPATIENT)
Dept: CT IMAGING | Age: 69
Discharge: HOME | End: 2019-01-17
Payer: MEDICARE

## 2019-01-15 ENCOUNTER — HOSPITAL ENCOUNTER (OUTPATIENT)
Dept: PHYSICAL THERAPY | Age: 69
Setting detail: THERAPIES SERIES
Discharge: HOME | End: 2019-01-15
Payer: MEDICARE

## 2019-01-15 ENCOUNTER — HOSPITAL ENCOUNTER (OUTPATIENT)
Dept: VASCULAR LAB | Age: 69
Discharge: HOME | End: 2019-01-15
Payer: MEDICARE

## 2019-01-15 DIAGNOSIS — I26.99 OTHER PULMONARY EMBOLISM WITHOUT ACUTE COR PULMONALE, UNSPECIFIED CHRONICITY (HCC): ICD-10-CM

## 2019-01-15 PROCEDURE — 97113 AQUATIC THERAPY/EXERCISES: CPT

## 2019-01-15 PROCEDURE — 71260 CT THORAX DX C+: CPT

## 2019-01-15 PROCEDURE — 2580000003 HC RX 258: Performed by: INTERNAL MEDICINE

## 2019-01-15 PROCEDURE — 93970 EXTREMITY STUDY: CPT

## 2019-01-15 PROCEDURE — 6360000004 HC RX CONTRAST MEDICATION: Performed by: FAMILY MEDICINE

## 2019-01-15 RX ORDER — SODIUM CHLORIDE 0.9 % (FLUSH) 0.9 %
10 SYRINGE (ML) INJECTION PRN
Status: DISCONTINUED | OUTPATIENT
Start: 2019-01-15 | End: 2019-01-18 | Stop reason: HOSPADM

## 2019-01-15 RX ORDER — 0.9 % SODIUM CHLORIDE 0.9 %
80 INTRAVENOUS SOLUTION INTRAVENOUS ONCE
Status: COMPLETED | OUTPATIENT
Start: 2019-01-15 | End: 2019-01-15

## 2019-01-15 RX ADMIN — Medication 10 ML: at 08:41

## 2019-01-15 RX ADMIN — IOVERSOL 75 ML: 741 INJECTION INTRA-ARTERIAL; INTRAVENOUS at 08:41

## 2019-01-15 RX ADMIN — SODIUM CHLORIDE 80 ML: 9 INJECTION, SOLUTION INTRAVENOUS at 08:41

## 2019-01-16 ENCOUNTER — HOSPITAL ENCOUNTER (OUTPATIENT)
Dept: PHYSICAL THERAPY | Age: 69
Setting detail: THERAPIES SERIES
Discharge: HOME | End: 2019-01-16
Payer: MEDICARE

## 2019-01-16 PROCEDURE — 97113 AQUATIC THERAPY/EXERCISES: CPT

## 2019-01-18 ENCOUNTER — HOSPITAL ENCOUNTER (OUTPATIENT)
Dept: PHYSICAL THERAPY | Age: 69
Setting detail: THERAPIES SERIES
Discharge: HOME | End: 2019-01-18
Payer: MEDICARE

## 2019-01-18 PROCEDURE — G8981 BODY POS CURRENT STATUS: HCPCS

## 2019-01-18 PROCEDURE — G8982 BODY POS GOAL STATUS: HCPCS

## 2019-01-18 PROCEDURE — 97113 AQUATIC THERAPY/EXERCISES: CPT

## 2019-01-21 ENCOUNTER — HOSPITAL ENCOUNTER (OUTPATIENT)
Dept: PHYSICAL THERAPY | Age: 69
Setting detail: THERAPIES SERIES
End: 2019-01-21
Payer: MEDICARE

## 2019-01-22 ASSESSMENT — PAIN DESCRIPTION - FREQUENCY: FREQUENCY: CONTINUOUS

## 2019-01-22 ASSESSMENT — PAIN DESCRIPTION - DESCRIPTORS: DESCRIPTORS: ACHING;CONSTANT

## 2019-01-22 ASSESSMENT — PAIN DESCRIPTION - ORIENTATION: ORIENTATION: LOWER

## 2019-01-22 ASSESSMENT — PAIN SCALES - GENERAL: PAINLEVEL_OUTOF10: 4

## 2019-01-22 ASSESSMENT — PAIN DESCRIPTION - ONSET: ONSET: ON-GOING

## 2019-01-22 ASSESSMENT — PAIN DESCRIPTION - PAIN TYPE: TYPE: CHRONIC PAIN

## 2019-01-22 ASSESSMENT — PAIN DESCRIPTION - LOCATION: LOCATION: BACK

## 2019-01-23 ENCOUNTER — HOSPITAL ENCOUNTER (OUTPATIENT)
Dept: PHYSICAL THERAPY | Age: 69
Setting detail: THERAPIES SERIES
Discharge: HOME | End: 2019-01-23
Payer: MEDICARE

## 2019-01-23 PROCEDURE — 97113 AQUATIC THERAPY/EXERCISES: CPT

## 2019-01-23 ASSESSMENT — PAIN DESCRIPTION - PAIN TYPE: TYPE: CHRONIC PAIN

## 2019-01-23 ASSESSMENT — PAIN SCALES - GENERAL: PAINLEVEL_OUTOF10: 2

## 2019-01-23 ASSESSMENT — PAIN DESCRIPTION - ORIENTATION: ORIENTATION: LEFT

## 2019-01-23 ASSESSMENT — PAIN DESCRIPTION - LOCATION: LOCATION: LEG

## 2019-01-25 ENCOUNTER — HOSPITAL ENCOUNTER (OUTPATIENT)
Dept: PHYSICAL THERAPY | Age: 69
Setting detail: THERAPIES SERIES
Discharge: HOME | End: 2019-01-25
Payer: MEDICARE

## 2019-01-25 PROCEDURE — 97113 AQUATIC THERAPY/EXERCISES: CPT

## 2019-01-25 ASSESSMENT — PAIN DESCRIPTION - DESCRIPTORS: DESCRIPTORS: ACHING;CONSTANT

## 2019-01-25 ASSESSMENT — PAIN DESCRIPTION - LOCATION: LOCATION: BACK;LEG

## 2019-01-25 ASSESSMENT — PAIN DESCRIPTION - PAIN TYPE: TYPE: CHRONIC PAIN

## 2019-01-25 ASSESSMENT — PAIN SCALES - GENERAL: PAINLEVEL_OUTOF10: 2

## 2019-01-25 ASSESSMENT — PAIN DESCRIPTION - FREQUENCY: FREQUENCY: CONTINUOUS

## 2019-01-25 ASSESSMENT — PAIN DESCRIPTION - ORIENTATION: ORIENTATION: LEFT

## 2019-01-28 ENCOUNTER — HOSPITAL ENCOUNTER (OUTPATIENT)
Dept: PHYSICAL THERAPY | Age: 69
Setting detail: THERAPIES SERIES
Discharge: HOME | End: 2019-01-28
Payer: MEDICARE

## 2019-01-28 ENCOUNTER — HOSPITAL ENCOUNTER (OUTPATIENT)
Dept: MRI IMAGING | Facility: CLINIC | Age: 69
Discharge: HOME | End: 2019-01-30
Payer: MEDICARE

## 2019-01-28 DIAGNOSIS — M54.6 THORACIC BACK PAIN, UNSPECIFIED BACK PAIN LATERALITY, UNSPECIFIED CHRONICITY: ICD-10-CM

## 2019-01-28 DIAGNOSIS — M54.2 CERVICAL PAIN: ICD-10-CM

## 2019-01-28 PROCEDURE — 97113 AQUATIC THERAPY/EXERCISES: CPT

## 2019-01-28 PROCEDURE — 72141 MRI NECK SPINE W/O DYE: CPT

## 2019-01-28 PROCEDURE — 72146 MRI CHEST SPINE W/O DYE: CPT

## 2019-01-30 ENCOUNTER — HOSPITAL ENCOUNTER (OUTPATIENT)
Dept: PHYSICAL THERAPY | Age: 69
Setting detail: THERAPIES SERIES
Discharge: HOME | End: 2019-01-30
Payer: MEDICARE

## 2019-01-30 PROCEDURE — 97113 AQUATIC THERAPY/EXERCISES: CPT

## 2019-01-31 ENCOUNTER — HOSPITAL ENCOUNTER (OUTPATIENT)
Dept: PHYSICAL THERAPY | Age: 69
Setting detail: THERAPIES SERIES
Discharge: HOME | End: 2019-01-31
Payer: MEDICARE

## 2019-01-31 ENCOUNTER — HOSPITAL ENCOUNTER (OUTPATIENT)
Dept: PHARMACY | Age: 69
Setting detail: THERAPIES SERIES
Discharge: HOME | End: 2019-01-31
Payer: MEDICARE

## 2019-01-31 DIAGNOSIS — I26.99 PE (PULMONARY THROMBOEMBOLISM) (HCC): ICD-10-CM

## 2019-01-31 LAB
INR BLD: 2.5
PROTIME: 29.4 SECONDS

## 2019-01-31 PROCEDURE — 85610 PROTHROMBIN TIME: CPT

## 2019-01-31 PROCEDURE — 97113 AQUATIC THERAPY/EXERCISES: CPT

## 2019-01-31 PROCEDURE — 99211 OFF/OP EST MAY X REQ PHY/QHP: CPT

## 2019-01-31 ASSESSMENT — PAIN SCALES - GENERAL: PAINLEVEL_OUTOF10: 1

## 2019-01-31 ASSESSMENT — PAIN DESCRIPTION - ORIENTATION: ORIENTATION: LOWER

## 2019-01-31 ASSESSMENT — PAIN DESCRIPTION - LOCATION: LOCATION: BACK

## 2019-01-31 ASSESSMENT — PAIN DESCRIPTION - PAIN TYPE: TYPE: CHRONIC PAIN

## 2019-02-04 ENCOUNTER — HOSPITAL ENCOUNTER (OUTPATIENT)
Dept: PHYSICAL THERAPY | Age: 69
Setting detail: THERAPIES SERIES
Discharge: HOME | End: 2019-02-04
Payer: MEDICARE

## 2019-02-04 PROCEDURE — 97113 AQUATIC THERAPY/EXERCISES: CPT

## 2019-02-06 ENCOUNTER — HOSPITAL ENCOUNTER (OUTPATIENT)
Dept: PHYSICAL THERAPY | Age: 69
Setting detail: THERAPIES SERIES
Discharge: HOME | End: 2019-02-06
Payer: MEDICARE

## 2019-02-08 ENCOUNTER — HOSPITAL ENCOUNTER (OUTPATIENT)
Dept: PHYSICAL THERAPY | Age: 69
Setting detail: THERAPIES SERIES
Discharge: HOME | End: 2019-02-08
Payer: MEDICARE

## 2019-02-08 PROCEDURE — 97113 AQUATIC THERAPY/EXERCISES: CPT

## 2019-02-08 ASSESSMENT — PAIN DESCRIPTION - LOCATION: LOCATION: BACK

## 2019-02-08 ASSESSMENT — PAIN DESCRIPTION - ORIENTATION: ORIENTATION: LOWER

## 2019-02-08 ASSESSMENT — PAIN SCALES - GENERAL: PAINLEVEL_OUTOF10: 1

## 2019-02-08 ASSESSMENT — PAIN DESCRIPTION - PAIN TYPE: TYPE: CHRONIC PAIN

## 2019-02-11 ENCOUNTER — HOSPITAL ENCOUNTER (OUTPATIENT)
Dept: PHYSICAL THERAPY | Age: 69
Setting detail: THERAPIES SERIES
Discharge: HOME | End: 2019-02-11
Payer: MEDICARE

## 2019-02-13 ENCOUNTER — APPOINTMENT (OUTPATIENT)
Dept: PHYSICAL THERAPY | Age: 69
End: 2019-02-13
Payer: MEDICARE

## 2019-02-13 ASSESSMENT — PAIN DESCRIPTION - ORIENTATION: ORIENTATION: LOWER

## 2019-02-13 ASSESSMENT — PAIN DESCRIPTION - LOCATION: LOCATION: BACK

## 2019-02-13 ASSESSMENT — PAIN DESCRIPTION - FREQUENCY: FREQUENCY: CONTINUOUS

## 2019-02-13 ASSESSMENT — PAIN DESCRIPTION - PAIN TYPE: TYPE: CHRONIC PAIN

## 2019-02-13 ASSESSMENT — PAIN DESCRIPTION - DESCRIPTORS: DESCRIPTORS: ACHING;CONSTANT

## 2019-02-13 ASSESSMENT — PAIN SCALES - GENERAL: PAINLEVEL_OUTOF10: 1

## 2019-02-18 ENCOUNTER — HOSPITAL ENCOUNTER (OUTPATIENT)
Dept: PHYSICAL THERAPY | Age: 69
Setting detail: THERAPIES SERIES
Discharge: HOME | End: 2019-02-18
Payer: MEDICARE

## 2019-02-18 PROCEDURE — 97113 AQUATIC THERAPY/EXERCISES: CPT

## 2019-02-18 ASSESSMENT — PAIN DESCRIPTION - LOCATION: LOCATION: BACK

## 2019-02-18 ASSESSMENT — PAIN DESCRIPTION - DESCRIPTORS: DESCRIPTORS: ACHING;CONSTANT

## 2019-02-18 ASSESSMENT — PAIN DESCRIPTION - FREQUENCY: FREQUENCY: CONTINUOUS

## 2019-02-18 ASSESSMENT — PAIN DESCRIPTION - ORIENTATION: ORIENTATION: LOWER

## 2019-02-18 ASSESSMENT — PAIN SCALES - GENERAL: PAINLEVEL_OUTOF10: 2

## 2019-02-18 ASSESSMENT — PAIN DESCRIPTION - PAIN TYPE: TYPE: CHRONIC PAIN

## 2019-02-20 ENCOUNTER — HOSPITAL ENCOUNTER (OUTPATIENT)
Dept: PHYSICAL THERAPY | Age: 69
Setting detail: THERAPIES SERIES
Discharge: HOME | End: 2019-02-20
Payer: MEDICARE

## 2019-02-20 PROCEDURE — 97113 AQUATIC THERAPY/EXERCISES: CPT

## 2019-02-28 ENCOUNTER — HOSPITAL ENCOUNTER (OUTPATIENT)
Dept: PHARMACY | Age: 69
Setting detail: THERAPIES SERIES
Discharge: HOME | End: 2019-02-28
Payer: MEDICARE

## 2019-02-28 DIAGNOSIS — I26.99 PE (PULMONARY THROMBOEMBOLISM) (HCC): ICD-10-CM

## 2019-02-28 LAB
INR BLD: 2.3
PROTIME: 27.7 SECONDS

## 2019-02-28 PROCEDURE — 99211 OFF/OP EST MAY X REQ PHY/QHP: CPT

## 2019-02-28 PROCEDURE — 85610 PROTHROMBIN TIME: CPT

## 2019-03-12 ENCOUNTER — HOSPITAL ENCOUNTER (OUTPATIENT)
Dept: PHYSICAL THERAPY | Age: 69
Setting detail: THERAPIES SERIES
Discharge: HOME | End: 2019-03-12
Payer: MEDICARE

## 2019-03-28 ENCOUNTER — HOSPITAL ENCOUNTER (OUTPATIENT)
Dept: PHARMACY | Age: 69
Setting detail: THERAPIES SERIES
Discharge: HOME | End: 2019-03-28
Payer: MEDICARE

## 2019-03-28 DIAGNOSIS — I26.99 PE (PULMONARY THROMBOEMBOLISM) (HCC): ICD-10-CM

## 2019-03-28 LAB
INR BLD: 2.4
PROTIME: 28.3 SECONDS

## 2019-03-28 PROCEDURE — 99211 OFF/OP EST MAY X REQ PHY/QHP: CPT

## 2019-03-28 PROCEDURE — 85610 PROTHROMBIN TIME: CPT

## 2019-04-22 ENCOUNTER — OFFICE VISIT (OUTPATIENT)
Dept: ORTHOPEDIC SURGERY | Age: 69
End: 2019-04-22
Payer: MEDICARE

## 2019-04-22 DIAGNOSIS — G89.29 CHRONIC PAIN OF LEFT KNEE: Primary | ICD-10-CM

## 2019-04-22 DIAGNOSIS — M25.562 CHRONIC PAIN OF LEFT KNEE: Primary | ICD-10-CM

## 2019-04-22 DIAGNOSIS — M25.562 LEFT KNEE PAIN, UNSPECIFIED CHRONICITY: ICD-10-CM

## 2019-04-22 PROCEDURE — 1123F ACP DISCUSS/DSCN MKR DOCD: CPT | Performed by: ORTHOPAEDIC SURGERY

## 2019-04-22 PROCEDURE — 99213 OFFICE O/P EST LOW 20 MIN: CPT | Performed by: ORTHOPAEDIC SURGERY

## 2019-04-22 PROCEDURE — 20610 DRAIN/INJ JOINT/BURSA W/O US: CPT | Performed by: ORTHOPAEDIC SURGERY

## 2019-04-22 PROCEDURE — 1036F TOBACCO NON-USER: CPT | Performed by: ORTHOPAEDIC SURGERY

## 2019-04-22 PROCEDURE — G8427 DOCREV CUR MEDS BY ELIG CLIN: HCPCS | Performed by: ORTHOPAEDIC SURGERY

## 2019-04-22 PROCEDURE — 3017F COLORECTAL CA SCREEN DOC REV: CPT | Performed by: ORTHOPAEDIC SURGERY

## 2019-04-22 PROCEDURE — G8417 CALC BMI ABV UP PARAM F/U: HCPCS | Performed by: ORTHOPAEDIC SURGERY

## 2019-04-22 PROCEDURE — 4040F PNEUMOC VAC/ADMIN/RCVD: CPT | Performed by: ORTHOPAEDIC SURGERY

## 2019-04-22 RX ORDER — BETAMETHASONE SODIUM PHOSPHATE AND BETAMETHASONE ACETATE 3; 3 MG/ML; MG/ML
12 INJECTION, SUSPENSION INTRA-ARTICULAR; INTRALESIONAL; INTRAMUSCULAR; SOFT TISSUE ONCE
Status: COMPLETED | OUTPATIENT
Start: 2019-04-22 | End: 2019-04-22

## 2019-04-22 RX ORDER — BUPIVACAINE HYDROCHLORIDE 5 MG/ML
2 INJECTION, SOLUTION PERINEURAL ONCE
Status: COMPLETED | OUTPATIENT
Start: 2019-04-22 | End: 2019-04-22

## 2019-04-22 RX ORDER — LIDOCAINE HYDROCHLORIDE 10 MG/ML
2 INJECTION, SOLUTION EPIDURAL; INFILTRATION; INTRACAUDAL; PERINEURAL ONCE
Status: COMPLETED | OUTPATIENT
Start: 2019-04-22 | End: 2019-04-22

## 2019-04-22 RX ADMIN — LIDOCAINE HYDROCHLORIDE 2 ML: 10 INJECTION, SOLUTION EPIDURAL; INFILTRATION; INTRACAUDAL; PERINEURAL at 15:00

## 2019-04-22 RX ADMIN — BUPIVACAINE HYDROCHLORIDE 10 MG: 5 INJECTION, SOLUTION PERINEURAL at 15:00

## 2019-04-22 RX ADMIN — BETAMETHASONE SODIUM PHOSPHATE AND BETAMETHASONE ACETATE 12 MG: 3; 3 INJECTION, SUSPENSION INTRA-ARTICULAR; INTRALESIONAL; INTRAMUSCULAR; SOFT TISSUE at 14:59

## 2019-04-22 NOTE — PROGRESS NOTES
Dany Whittington M.D.            [address] [place] [address]., [address] [facility], [facility] [address].             Dept Phone: [phone]             Dept Fax:  [phone]  Joselyn Dewey returns today. He was seen by me last year for left leg pain healed and up going to see Dr. Lucy Ji the neurosurgeon and had what sounds like a couple level decompression. He states that it really helped him out a lot. He's having just knee pain today. Says it feels like it wants to give out on her once a while. Just no acute injury or trauma that he can recall    Examination his left knee notes a very slight effusion. His motion is 7-130 degrees. Camila's is very unremarkable. Collaterals are good cruciates are good some patellofemoral crepitus no pain or rotation of his hip no trochanteric findings mild pain with straight leg raise not too severe    X-rays taken today reviewed by me show AP of both knees a lateral left knee. These were compared with views taken back in 2018 he does have fairly moderate medial joint space narrowing of the left knee compared to the right. Otherwise pretty much no change. Impression  DJD left knee  History of a lumbar decompression surgery    Plan  Under sterile conditions patient's left knee was injected with lidocaine Celestone. He Blair procedure well. We'll see how he does was injection. Back here otherwise in 4 months        XR taken today:  No results found. Review of Systems   Constitutional: Negative. HENT: Negative. Respiratory: Negative. Cardiovascular: Negative. Neurological: Negative.     Musculoskeletal:   Pain (left knee pain ) and Knee Pain          Current Outpatient Medications:     acetaminophen (TYLENOL) 500 MG tablet, Take 1,000 mg by mouth 3 times daily as needed for Pain, Disp: , Rfl:     etodolac (LODINE) 500 MG tablet, Take 500 mg by mouth 2 times daily, Disp: , Rfl:     warfarin (COUMADIN) 5 MG tablet, Take 5 mg daily or as directed by [facility], Disp: 200 tablet, Rfl: 2    atenolol (TENORMIN) 25 MG tablet, TAKE 1 TABLET TWICE DAILY, Disp: 180 tablet, Rfl: 3    mometasone (ELOCON) 0.1 % cream, Apply topically daily to right temple, Disp: 45 g, Rfl: 1    citalopram (CELEXA) 40 MG tablet, TAKE 1 TABLET EVERY DAY, Disp: 90 tablet, Rfl: 3    donepezil (ARICEPT) 10 MG tablet, TAKE 1 TABLET EVERY DAY, Disp: 90 tablet, Rfl: 3    atorvastatin (LIPITOR) 40 MG tablet, TAKE 1 TABLET EVERY DAY, Disp: 90 tablet, Rfl: 3    losartan (COZAAR) 100 MG tablet, TAKE 1 TABLET EVERY DAY, Disp: 90 tablet, Rfl: 3    loratadine (CLARITIN) 10 MG tablet, Take 1 tablet by mouth daily, Disp: , Rfl:     omeprazole (PRILOSEC) 40 MG delayed release capsule, TAKE 1 CAPSULE EVERY DAY, Disp: 90 capsule, Rfl: 3    fluticasone (FLONASE) 50 MCG/ACT nasal spray, USE 2 SPRAYS IN EACH NOSTRIL EVERY DAY, Disp: 3 Bottle, Rfl: 3    SYMBICORT 160-4.5 MCG/ACT AERO, Inhale 2 puffs into the lungs daily, Disp: , Rfl:     B Complex Vitamins (VITAMIN B COMPLEX PO), Take 1 tablet by mouth daily, Disp: , Rfl:     No Known Allergies    Social History     Socioeconomic History    Marital status:      Spouse name: Not on file    Number of children: Not on file    Years of education: Not on file    Highest education level: Not on file   Occupational History    Occupation: packaging     Employer: [employer] Financial resource strain: Not on file    Food insecurity:     Worry: Not on file     Inability: Not on file    Transportation needs:     Medical: Not on file     Non-medical: Not on file   Tobacco Use    Smoking status: Former Smoker    Smokeless tobacco: Never Used   Substance and Sexual Activity    Alcohol use: No     Alcohol/week: 0.0 oz     Comment: very rare    Drug use: No    Sexual activity: Not on file   Lifestyle    Physical activity:     Days per week: Not on file the accuracy of this automated transcription, some errors in transcription may have occurred.

## 2019-04-25 ENCOUNTER — TELEPHONE (OUTPATIENT)
Dept: PHARMACY | Age: 69
End: 2019-04-25

## 2019-04-25 ENCOUNTER — APPOINTMENT (OUTPATIENT)
Dept: PHARMACY | Age: 69
End: 2019-04-25
Payer: MEDICARE

## 2019-05-01 ENCOUNTER — HOSPITAL ENCOUNTER (OUTPATIENT)
Dept: PHARMACY | Age: 69
Setting detail: THERAPIES SERIES
Discharge: HOME | End: 2019-05-01
Payer: MEDICARE

## 2019-05-01 DIAGNOSIS — I26.99 PE (PULMONARY THROMBOEMBOLISM) (HCC): ICD-10-CM

## 2019-05-01 LAB
INR BLD: 2.7
PROTIME: 32.9 SECONDS

## 2019-05-01 PROCEDURE — 85610 PROTHROMBIN TIME: CPT

## 2019-05-01 PROCEDURE — 99211 OFF/OP EST MAY X REQ PHY/QHP: CPT

## 2019-05-01 RX ORDER — M-VIT,TX,IRON,MINS/CALC/FOLIC 27MG-0.4MG
1 TABLET ORAL DAILY
COMMUNITY

## 2019-05-01 RX ORDER — PANTOPRAZOLE SODIUM 20 MG/1
20 TABLET, DELAYED RELEASE ORAL DAILY
COMMUNITY

## 2019-05-01 RX ORDER — BUPROPION HYDROCHLORIDE 150 MG/1
150 TABLET, EXTENDED RELEASE ORAL 2 TIMES DAILY
COMMUNITY

## 2019-05-01 NOTE — PROGRESS NOTES
Patient states compliant all of the time with regimen. No bleeding or thromboembolic side effects noted. No significant med or dietary changes but patient medications reviewed. No significant recent illness or disease state changes. PT/INR done in office per protocol. INR was therapeutic at 2.7 (goal 2-3). Warfarin regimen will be continued at current dose of 5mg every day. Will retest in 4 weeks. Patient moving to [facility] and will switch to [address] over and spoke to Janna Berman who schedules patient for 6/5/19. Patient understands dosing directions and information discussed. Dosing schedule and follow up appointment given to patient. Progress note routed to referring physicians office. Patient acknowledges working in [address] [place] [address] with Pharmacist as referred by his/her physician.

## 2019-06-05 ENCOUNTER — TELEPHONE (OUTPATIENT)
Dept: PHARMACY | Age: 69
End: 2019-06-05

## 2019-06-05 DIAGNOSIS — I26.99 PE (PULMONARY THROMBOEMBOLISM) (HCC): ICD-10-CM

## 2019-06-05 NOTE — TELEPHONE ENCOUNTER
Patient indicated the need to reschedule per [facility] reminder call service.   Called and left voice mail to reschedule

## 2019-06-13 ENCOUNTER — HOSPITAL ENCOUNTER (OUTPATIENT)
Dept: PHARMACY | Age: 69
Setting detail: THERAPIES SERIES
Discharge: HOME | End: 2019-06-13
Payer: MEDICARE

## 2019-06-13 DIAGNOSIS — I26.99 PE (PULMONARY THROMBOEMBOLISM) (HCC): ICD-10-CM

## 2019-06-13 LAB
INR BLD: 2.7
PROTIME: 32 SECONDS

## 2019-06-13 PROCEDURE — 85610 PROTHROMBIN TIME: CPT

## 2019-06-13 PROCEDURE — 99211 OFF/OP EST MAY X REQ PHY/QHP: CPT

## 2019-06-13 NOTE — PROGRESS NOTES
Patient states compliant all of the time with regimen. No bleeding or thromboembolic side effects noted. No significant med or dietary changes. No significant recent illness or disease state changes. Pt transferred from [facility]. PT/INR done in office per protocol. INR is 2.7 which is therapeutic. Warfarin regimen will be continued at current dose of 5mg daily. Will retest in 6 weeks. Patient understands dosing directions and information discussed. Dosing schedule and follow up appointment given to patient. Progress note routed to referring physicians office. Patient acknowledges working in consult agreement with pharmacist as referred by his/her physician.       Alyssa Ruiz, 6/13/2019 4:06 PM

## 2019-07-08 ENCOUNTER — HOSPITAL ENCOUNTER (OUTPATIENT)
Dept: CT IMAGING | Facility: CLINIC | Age: 69
Discharge: HOME | End: 2019-07-10
Payer: MEDICARE

## 2019-07-08 DIAGNOSIS — R94.2 ABNORMAL LUNG SCAN: ICD-10-CM

## 2019-07-08 DIAGNOSIS — R91.1 LUNG NODULE: ICD-10-CM

## 2019-07-08 PROCEDURE — 71250 CT THORAX DX C-: CPT

## 2019-07-24 ENCOUNTER — OFFICE VISIT (OUTPATIENT)
Dept: ORTHOPEDIC SURGERY | Age: 69
End: 2019-07-24
Payer: MEDICARE

## 2019-07-24 DIAGNOSIS — G89.29 CHRONIC PAIN OF LEFT KNEE: Primary | ICD-10-CM

## 2019-07-24 DIAGNOSIS — M25.562 CHRONIC PAIN OF LEFT KNEE: Primary | ICD-10-CM

## 2019-07-24 PROCEDURE — G8427 DOCREV CUR MEDS BY ELIG CLIN: HCPCS | Performed by: ORTHOPAEDIC SURGERY

## 2019-07-24 PROCEDURE — 4040F PNEUMOC VAC/ADMIN/RCVD: CPT | Performed by: ORTHOPAEDIC SURGERY

## 2019-07-24 PROCEDURE — G8421 BMI NOT CALCULATED: HCPCS | Performed by: ORTHOPAEDIC SURGERY

## 2019-07-24 PROCEDURE — 1123F ACP DISCUSS/DSCN MKR DOCD: CPT | Performed by: ORTHOPAEDIC SURGERY

## 2019-07-24 PROCEDURE — 99213 OFFICE O/P EST LOW 20 MIN: CPT | Performed by: ORTHOPAEDIC SURGERY

## 2019-07-24 PROCEDURE — 20610 DRAIN/INJ JOINT/BURSA W/O US: CPT | Performed by: ORTHOPAEDIC SURGERY

## 2019-07-24 PROCEDURE — 1036F TOBACCO NON-USER: CPT | Performed by: ORTHOPAEDIC SURGERY

## 2019-07-24 PROCEDURE — 3017F COLORECTAL CA SCREEN DOC REV: CPT | Performed by: ORTHOPAEDIC SURGERY

## 2019-07-24 RX ORDER — BUPIVACAINE HYDROCHLORIDE 5 MG/ML
2 INJECTION, SOLUTION PERINEURAL ONCE
Status: COMPLETED | OUTPATIENT
Start: 2019-07-24 | End: 2019-07-24

## 2019-07-24 RX ORDER — BETAMETHASONE SODIUM PHOSPHATE AND BETAMETHASONE ACETATE 3; 3 MG/ML; MG/ML
12 INJECTION, SUSPENSION INTRA-ARTICULAR; INTRALESIONAL; INTRAMUSCULAR; SOFT TISSUE ONCE
Status: COMPLETED | OUTPATIENT
Start: 2019-07-24 | End: 2019-07-24

## 2019-07-24 RX ORDER — LIDOCAINE HYDROCHLORIDE 10 MG/ML
5 INJECTION, SOLUTION INFILTRATION; PERINEURAL ONCE
Status: COMPLETED | OUTPATIENT
Start: 2019-07-24 | End: 2019-07-24

## 2019-07-24 RX ADMIN — BUPIVACAINE HYDROCHLORIDE 10 MG: 5 INJECTION, SOLUTION PERINEURAL at 14:54

## 2019-07-24 RX ADMIN — LIDOCAINE HYDROCHLORIDE 5 ML: 10 INJECTION, SOLUTION INFILTRATION; PERINEURAL at 14:55

## 2019-07-24 RX ADMIN — BETAMETHASONE SODIUM PHOSPHATE AND BETAMETHASONE ACETATE 12 MG: 3; 3 INJECTION, SUSPENSION INTRA-ARTICULAR; INTRALESIONAL; INTRAMUSCULAR; SOFT TISSUE at 14:41

## 2019-07-24 NOTE — PROGRESS NOTES
together: Not on file     Attends [employer] service: Not on file     Active member of club or organization: Not on file     Attends meetings of clubs or organizations: Not on file     Relationship status: Not on file    Intimate partner violence:     Fear of current or ex partner: Not on file     Emotionally abused: Not on file     Physically abused: Not on file     Forced sexual activity: Not on file   Other Topics Concern    Not on file   Social History Narrative    Not on file       Past Medical History:   Diagnosis Date    Acute pansinusitis     Allergic rhinitis     Asthma     Asthma     Dementia without behavioral disturbance     Gastroesophageal reflux     History of DVT (deep vein thrombosis) 8/27/2015    Hx of blood clots     Hyperlipidemia     Hyperlipidemia LDL goal < 100     Hypertension     Hypertension, benign     Major depression, chronic 8/27/2015    Osteoarthritis     Pulmonary emboli ([facility] [address] 2007 approx. bilateral    Severe obesity (BMI 35.0-35.9 with comorbidity) ([facility] [address] 8/27/2015    Sleep apnea      Past Surgical History:   Procedure Laterality Date    ANKLE FUSION      CARPAL TUNNEL RELEASE      JOINT REPLACEMENT      SHOULDER ARTHROPLASTY      TONSILLECTOMY       Family History   Problem Relation Age of Onset    Heart Disease Father     High Blood Pressure Father            Orders Placed This Encounter   Procedures    20610 - DRAIN/INJECT LARGE JOINT BURSA       1. Chronic pain of left knee            Marily Miranda MD    Please note that this chart was generated using voice recognition Dragon dictation software. Although every effort was made to ensure the accuracy of this automated transcription, some errors in transcription may have occurred.

## 2019-07-25 ENCOUNTER — HOSPITAL ENCOUNTER (OUTPATIENT)
Dept: PHARMACY | Age: 69
Setting detail: THERAPIES SERIES
Discharge: HOME | End: 2019-07-25
Payer: MEDICARE

## 2019-07-25 DIAGNOSIS — I26.99 PE (PULMONARY THROMBOEMBOLISM) (HCC): ICD-10-CM

## 2019-07-25 LAB
INR BLD: 2.3
PROTIME: 27.8 SECONDS

## 2019-07-25 PROCEDURE — 85610 PROTHROMBIN TIME: CPT

## 2019-07-25 PROCEDURE — 99211 OFF/OP EST MAY X REQ PHY/QHP: CPT

## 2019-10-03 ENCOUNTER — TELEPHONE (OUTPATIENT)
Dept: PHARMACY | Age: 69
End: 2019-10-03

## 2019-10-03 DIAGNOSIS — I26.99 PE (PULMONARY THROMBOEMBOLISM) (HCC): ICD-10-CM

## 2019-10-10 ENCOUNTER — HOSPITAL ENCOUNTER (OUTPATIENT)
Dept: PHARMACY | Age: 69
Setting detail: THERAPIES SERIES
Discharge: HOME | End: 2019-10-10
Payer: MEDICARE

## 2019-10-10 ENCOUNTER — TELEPHONE (OUTPATIENT)
Dept: PHARMACY | Age: 69
End: 2019-10-10

## 2019-10-10 DIAGNOSIS — I26.99 PE (PULMONARY THROMBOEMBOLISM) (HCC): ICD-10-CM

## 2019-10-10 LAB — INR BLD: 2.3

## 2019-11-13 ENCOUNTER — TELEPHONE (OUTPATIENT)
Dept: PHARMACY | Age: 69
End: 2019-11-13

## 2019-11-13 DIAGNOSIS — I26.99 PE (PULMONARY THROMBOEMBOLISM) (HCC): ICD-10-CM

## 2019-11-18 ENCOUNTER — TELEPHONE (OUTPATIENT)
Dept: PHARMACY | Age: 69
End: 2019-11-18

## 2019-11-18 DIAGNOSIS — I26.99 PE (PULMONARY THROMBOEMBOLISM) (HCC): ICD-10-CM

## 2019-12-02 ENCOUNTER — HOSPITAL ENCOUNTER (OUTPATIENT)
Age: 69
Setting detail: SPECIMEN
Discharge: HOME | End: 2019-12-02
Payer: MEDICARE

## 2019-12-02 LAB
ABSOLUTE EOS #: 0.26 K/UL (ref 0–0.44)
ABSOLUTE IMMATURE GRANULOCYTE: <0.03 K/UL (ref 0–0.3)
ABSOLUTE LYMPH #: 1.93 K/UL (ref 1.1–3.7)
ABSOLUTE MONO #: 0.75 K/UL (ref 0.1–1.2)
ALBUMIN SERPL-MCNC: 4.2 G/DL (ref 3.5–5.2)
ALBUMIN/GLOBULIN RATIO: 1.4 (ref 1–2.5)
ALP BLD-CCNC: 108 U/L (ref 40–129)
ALT SERPL-CCNC: 29 U/L (ref 5–41)
ANION GAP SERPL CALCULATED.3IONS-SCNC: 13 MMOL/L (ref 9–17)
AST SERPL-CCNC: 29 U/L
BASOPHILS # BLD: 1 % (ref 0–2)
BASOPHILS ABSOLUTE: 0.07 K/UL (ref 0–0.2)
BILIRUB SERPL-MCNC: 0.55 MG/DL (ref 0.3–1.2)
BUN BLDV-MCNC: 15 MG/DL (ref 8–23)
BUN/CREAT BLD: NORMAL (ref 9–20)
CALCIUM SERPL-MCNC: 9.3 MG/DL (ref 8.6–10.4)
CHLORIDE BLD-SCNC: 102 MMOL/L (ref 98–107)
CO2: 25 MMOL/L (ref 20–31)
CREAT SERPL-MCNC: 0.76 MG/DL (ref 0.7–1.2)
DIFFERENTIAL TYPE: ABNORMAL
EOSINOPHILS RELATIVE PERCENT: 4 % (ref 1–4)
GFR AFRICAN AMERICAN: >60 ML/MIN
GFR NON-AFRICAN AMERICAN: >60 ML/MIN
GFR SERPL CREATININE-BSD FRML MDRD: NORMAL ML/MIN/{1.73_M2}
GFR SERPL CREATININE-BSD FRML MDRD: NORMAL ML/MIN/{1.73_M2}
GLUCOSE BLD-MCNC: 96 MG/DL (ref 70–99)
HCT VFR BLD CALC: 46.3 % (ref 40.7–50.3)
HEMOGLOBIN: 15.2 G/DL (ref 13–17)
IMMATURE GRANULOCYTES: 0 %
INR BLD: 1.5
LYMPHOCYTES # BLD: 32 % (ref 24–43)
MCH RBC QN AUTO: 29.3 PG (ref 25.2–33.5)
MCHC RBC AUTO-ENTMCNC: 32.8 G/DL (ref 28.4–34.8)
MCV RBC AUTO: 89.2 FL (ref 82.6–102.9)
MONOCYTES # BLD: 13 % (ref 3–12)
NRBC AUTOMATED: 0 PER 100 WBC
PDW BLD-RTO: 14.4 % (ref 11.8–14.4)
PLATELET # BLD: 225 K/UL (ref 138–453)
PLATELET ESTIMATE: ABNORMAL
PMV BLD AUTO: 11.1 FL (ref 8.1–13.5)
POTASSIUM SERPL-SCNC: 4.3 MMOL/L (ref 3.7–5.3)
PROTHROMBIN TIME: 15.4 SEC (ref 9–12)
RBC # BLD: 5.19 M/UL (ref 4.21–5.77)
RBC # BLD: ABNORMAL 10*6/UL
SEG NEUTROPHILS: 50 % (ref 36–65)
SEGMENTED NEUTROPHILS ABSOLUTE COUNT: 3 K/UL (ref 1.5–8.1)
SODIUM BLD-SCNC: 140 MMOL/L (ref 135–144)
TOTAL PROTEIN: 7.1 G/DL (ref 6.4–8.3)
WBC # BLD: 6 K/UL (ref 3.5–11.3)
WBC # BLD: ABNORMAL 10*3/UL

## 2020-02-27 ENCOUNTER — HOSPITAL ENCOUNTER (OUTPATIENT)
Age: 70
Setting detail: SPECIMEN
Discharge: HOME | End: 2020-02-27
Payer: MEDICARE

## 2020-02-27 LAB
ABSOLUTE EOS #: 0.44 K/UL (ref 0–0.44)
ABSOLUTE IMMATURE GRANULOCYTE: <0.03 K/UL (ref 0–0.3)
ABSOLUTE LYMPH #: 1.85 K/UL (ref 1.1–3.7)
ABSOLUTE MONO #: 0.71 K/UL (ref 0.1–1.2)
ALBUMIN SERPL-MCNC: 4.2 G/DL (ref 3.5–5.2)
ALBUMIN/GLOBULIN RATIO: 1.7 (ref 1–2.5)
ALP BLD-CCNC: 97 U/L (ref 40–129)
ALT SERPL-CCNC: 23 U/L (ref 5–41)
ANION GAP SERPL CALCULATED.3IONS-SCNC: 13 MMOL/L (ref 9–17)
AST SERPL-CCNC: 24 U/L
BASOPHILS # BLD: 2 % (ref 0–2)
BASOPHILS ABSOLUTE: 0.09 K/UL (ref 0–0.2)
BILIRUB SERPL-MCNC: 0.53 MG/DL (ref 0.3–1.2)
BUN BLDV-MCNC: 13 MG/DL (ref 8–23)
BUN/CREAT BLD: ABNORMAL (ref 9–20)
CALCIUM SERPL-MCNC: 9 MG/DL (ref 8.6–10.4)
CHLORIDE BLD-SCNC: 106 MMOL/L (ref 98–107)
CO2: 24 MMOL/L (ref 20–31)
CREAT SERPL-MCNC: 1.09 MG/DL (ref 0.7–1.2)
DIFFERENTIAL TYPE: ABNORMAL
EOSINOPHILS RELATIVE PERCENT: 8 % (ref 1–4)
GFR AFRICAN AMERICAN: >60 ML/MIN
GFR NON-AFRICAN AMERICAN: >60 ML/MIN
GFR SERPL CREATININE-BSD FRML MDRD: ABNORMAL ML/MIN/{1.73_M2}
GFR SERPL CREATININE-BSD FRML MDRD: ABNORMAL ML/MIN/{1.73_M2}
GLUCOSE BLD-MCNC: 110 MG/DL (ref 70–99)
HCT VFR BLD CALC: 43.6 % (ref 40.7–50.3)
HEMOGLOBIN: 14.4 G/DL (ref 13–17)
IMMATURE GRANULOCYTES: 0 %
INR BLD: 2.5
LYMPHOCYTES # BLD: 35 % (ref 24–43)
MCH RBC QN AUTO: 29.8 PG (ref 25.2–33.5)
MCHC RBC AUTO-ENTMCNC: 33 G/DL (ref 28.4–34.8)
MCV RBC AUTO: 90.3 FL (ref 82.6–102.9)
MONOCYTES # BLD: 14 % (ref 3–12)
NRBC AUTOMATED: 0 PER 100 WBC
PDW BLD-RTO: 14.7 % (ref 11.8–14.4)
PLATELET # BLD: 197 K/UL (ref 138–453)
PLATELET ESTIMATE: ABNORMAL
PMV BLD AUTO: 11 FL (ref 8.1–13.5)
POTASSIUM SERPL-SCNC: 4.6 MMOL/L (ref 3.7–5.3)
PROTHROMBIN TIME: 24.4 SEC (ref 9–12)
RBC # BLD: 4.83 M/UL (ref 4.21–5.77)
RBC # BLD: ABNORMAL 10*6/UL
SEG NEUTROPHILS: 41 % (ref 36–65)
SEGMENTED NEUTROPHILS ABSOLUTE COUNT: 2.14 K/UL (ref 1.5–8.1)
SODIUM BLD-SCNC: 143 MMOL/L (ref 135–144)
TOTAL PROTEIN: 6.7 G/DL (ref 6.4–8.3)
WBC # BLD: 5.2 K/UL (ref 3.5–11.3)
WBC # BLD: ABNORMAL 10*3/UL

## 2020-04-22 RX ORDER — METOPROLOL TARTRATE 1 MG/ML
2.5 INJECTION, SOLUTION INTRAVENOUS EVERY 5 MIN PRN
Status: CANCELLED | OUTPATIENT
Start: 2020-04-22

## 2020-04-22 RX ORDER — NITROGLYCERIN 0.4 MG/1
0.4 TABLET SUBLINGUAL ONCE
Status: CANCELLED | OUTPATIENT
Start: 2020-04-24

## 2020-04-22 RX ORDER — SODIUM CHLORIDE 9 MG/ML
INJECTION, SOLUTION INTRAVENOUS CONTINUOUS
Status: CANCELLED | OUTPATIENT
Start: 2020-04-22

## 2020-04-24 ENCOUNTER — HOSPITAL ENCOUNTER (OUTPATIENT)
Dept: CT IMAGING | Age: 70
Discharge: HOME | End: 2020-04-26
Payer: MEDICARE

## 2020-04-24 ENCOUNTER — HOSPITAL ENCOUNTER (OUTPATIENT)
Dept: GENERAL RADIOLOGY | Age: 70
Discharge: HOME | End: 2020-04-26
Payer: MEDICARE

## 2020-04-24 ENCOUNTER — HOSPITAL ENCOUNTER (OUTPATIENT)
Age: 70
Discharge: HOME | End: 2020-04-26
Payer: MEDICARE

## 2020-04-24 VITALS
HEART RATE: 50 BPM | RESPIRATION RATE: 24 BRPM | DIASTOLIC BLOOD PRESSURE: 73 MMHG | OXYGEN SATURATION: 97 % | SYSTOLIC BLOOD PRESSURE: 151 MMHG

## 2020-04-24 LAB
BUN BLDV-MCNC: 18 MG/DL (ref 8–23)
CREAT SERPL-MCNC: 0.95 MG/DL (ref 0.7–1.2)
GFR AFRICAN AMERICAN: >60 ML/MIN
GFR NON-AFRICAN AMERICAN: >60 ML/MIN
GFR SERPL CREATININE-BSD FRML MDRD: NORMAL ML/MIN/{1.73_M2}
GFR SERPL CREATININE-BSD FRML MDRD: NORMAL ML/MIN/{1.73_M2}

## 2020-04-24 PROCEDURE — 6360000004 HC RX CONTRAST MEDICATION: Performed by: INTERNAL MEDICINE

## 2020-04-24 PROCEDURE — 75574 CT ANGIO HRT W/3D IMAGE: CPT

## 2020-04-24 PROCEDURE — 36415 COLL VENOUS BLD VENIPUNCTURE: CPT

## 2020-04-24 PROCEDURE — 73562 X-RAY EXAM OF KNEE 3: CPT

## 2020-04-24 PROCEDURE — 84520 ASSAY OF UREA NITROGEN: CPT

## 2020-04-24 PROCEDURE — 6370000000 HC RX 637 (ALT 250 FOR IP): Performed by: INTERNAL MEDICINE

## 2020-04-24 PROCEDURE — 82565 ASSAY OF CREATININE: CPT

## 2020-04-24 PROCEDURE — 2580000003 HC RX 258: Performed by: INTERNAL MEDICINE

## 2020-04-24 RX ORDER — 0.9 % SODIUM CHLORIDE 0.9 %
95 INTRAVENOUS SOLUTION INTRAVENOUS ONCE
Status: COMPLETED | OUTPATIENT
Start: 2020-04-24 | End: 2020-04-24

## 2020-04-24 RX ORDER — SODIUM CHLORIDE 9 MG/ML
INJECTION, SOLUTION INTRAVENOUS CONTINUOUS
Status: DISCONTINUED | OUTPATIENT
Start: 2020-04-24 | End: 2020-04-27 | Stop reason: HOSPADM

## 2020-04-24 RX ORDER — VERAPAMIL HYDROCHLORIDE 240 MG/1
240 TABLET, FILM COATED, EXTENDED RELEASE ORAL ONCE
Status: DISCONTINUED | OUTPATIENT
Start: 2020-04-24 | End: 2020-04-24

## 2020-04-24 RX ORDER — METOPROLOL TARTRATE 5 MG/5ML
2.5 INJECTION INTRAVENOUS EVERY 5 MIN PRN
Status: DISCONTINUED | OUTPATIENT
Start: 2020-04-24 | End: 2020-04-24

## 2020-04-24 RX ORDER — VERAPAMIL HYDROCHLORIDE 120 MG/1
240 TABLET, FILM COATED ORAL ONCE
Status: CANCELLED | OUTPATIENT
Start: 2020-04-24

## 2020-04-24 RX ORDER — NITROGLYCERIN 0.4 MG/1
0.4 TABLET SUBLINGUAL ONCE
Status: COMPLETED | OUTPATIENT
Start: 2020-04-24 | End: 2020-04-24

## 2020-04-24 RX ORDER — SODIUM CHLORIDE 0.9 % (FLUSH) 0.9 %
10 SYRINGE (ML) INJECTION PRN
Status: DISCONTINUED | OUTPATIENT
Start: 2020-04-24 | End: 2020-04-27 | Stop reason: HOSPADM

## 2020-04-24 RX ADMIN — SODIUM CHLORIDE: 9 INJECTION, SOLUTION INTRAVENOUS at 14:32

## 2020-04-24 RX ADMIN — SODIUM CHLORIDE 95 ML: 9 INJECTION, SOLUTION INTRAVENOUS at 15:01

## 2020-04-24 RX ADMIN — IOVERSOL 100 ML: 741 INJECTION INTRA-ARTERIAL; INTRAVENOUS at 15:02

## 2020-04-24 RX ADMIN — NITROGLYCERIN 0.4 MG: 0.4 TABLET, ORALLY DISINTEGRATING SUBLINGUAL at 14:42

## 2020-04-24 RX ADMIN — Medication 10 ML: at 15:02

## 2020-04-24 NOTE — PROGRESS NOTES
Pt to IR, updated on procedure and hooked up to heart monitor and IV started. HR 52, /77, pt short of breath, but pt states that in normal lab work WNL. Armand Gallego

## 2020-05-03 ENCOUNTER — HOSPITAL ENCOUNTER (OUTPATIENT)
Dept: PREADMISSION TESTING | Age: 70
Setting detail: SPECIMEN
Discharge: HOME | End: 2020-05-07
Payer: MEDICARE

## 2020-05-03 LAB
SARS-COV-2, PCR: NORMAL
SARS-COV-2, RAPID: NORMAL
SARS-COV-2: NOT DETECTED
SOURCE: NORMAL

## 2020-05-03 PROCEDURE — U0002 COVID-19 LAB TEST NON-CDC: HCPCS

## 2020-05-04 ENCOUNTER — TELEPHONE (OUTPATIENT)
Dept: PRIMARY CARE CLINIC | Age: 70
End: 2020-05-04

## 2020-05-05 ENCOUNTER — HOSPITAL ENCOUNTER (OUTPATIENT)
Dept: CARDIAC CATH/INVASIVE PROCEDURES | Age: 70
Discharge: HOME | End: 2020-05-05
Payer: MEDICARE

## 2020-05-05 VITALS
HEART RATE: 51 BPM | RESPIRATION RATE: 19 BRPM | SYSTOLIC BLOOD PRESSURE: 139 MMHG | OXYGEN SATURATION: 95 % | BODY MASS INDEX: 37.35 KG/M2 | DIASTOLIC BLOOD PRESSURE: 73 MMHG | HEIGHT: 67 IN | WEIGHT: 238 LBS

## 2020-05-05 LAB
ACTIVATED CLOTTING TIME: 300 SEC (ref 79–149)
GFR NON-AFRICAN AMERICAN: >60 ML/MIN
GFR SERPL CREATININE-BSD FRML MDRD: >60 ML/MIN
GFR SERPL CREATININE-BSD FRML MDRD: NORMAL ML/MIN/{1.73_M2}
GLUCOSE BLD-MCNC: 131 MG/DL (ref 74–100)
PLATELET # BLD: 174 K/UL (ref 138–453)
POC CHLORIDE: 106 MMOL/L (ref 98–107)
POC CREATININE: 1 MG/DL (ref 0.51–1.19)
POC HEMATOCRIT: 45 % (ref 41–53)
POC HEMOGLOBIN: 15.2 G/DL (ref 13.5–17.5)
POC INR: 1.1
POC POTASSIUM: 4.2 MMOL/L (ref 3.5–4.5)
POC SODIUM: 139 MMOL/L (ref 138–146)
PROTHROMBIN TIME, POC: 13.1 SEC (ref 10.4–14.2)

## 2020-05-05 PROCEDURE — 6370000000 HC RX 637 (ALT 250 FOR IP)

## 2020-05-05 PROCEDURE — 84295 ASSAY OF SERUM SODIUM: CPT

## 2020-05-05 PROCEDURE — 82947 ASSAY GLUCOSE BLOOD QUANT: CPT

## 2020-05-05 PROCEDURE — 2500000003 HC RX 250 WO HCPCS

## 2020-05-05 PROCEDURE — 6360000004 HC RX CONTRAST MEDICATION

## 2020-05-05 PROCEDURE — 36415 COLL VENOUS BLD VENIPUNCTURE: CPT

## 2020-05-05 PROCEDURE — C1874 STENT, COATED/COV W/DEL SYS: HCPCS

## 2020-05-05 PROCEDURE — 6360000002 HC RX W HCPCS

## 2020-05-05 PROCEDURE — 82435 ASSAY OF BLOOD CHLORIDE: CPT

## 2020-05-05 PROCEDURE — 84132 ASSAY OF SERUM POTASSIUM: CPT

## 2020-05-05 PROCEDURE — C1769 GUIDE WIRE: HCPCS

## 2020-05-05 PROCEDURE — 7100000000 HC PACU RECOVERY - FIRST 15 MIN

## 2020-05-05 PROCEDURE — C9600 PERC DRUG-EL COR STENT SING: HCPCS | Performed by: INTERNAL MEDICINE

## 2020-05-05 PROCEDURE — 85347 COAGULATION TIME ACTIVATED: CPT

## 2020-05-05 PROCEDURE — 85014 HEMATOCRIT: CPT

## 2020-05-05 PROCEDURE — 85049 AUTOMATED PLATELET COUNT: CPT

## 2020-05-05 PROCEDURE — 93458 L HRT ARTERY/VENTRICLE ANGIO: CPT | Performed by: INTERNAL MEDICINE

## 2020-05-05 PROCEDURE — 82565 ASSAY OF CREATININE: CPT

## 2020-05-05 PROCEDURE — C1725 CATH, TRANSLUMIN NON-LASER: HCPCS

## 2020-05-05 PROCEDURE — 85610 PROTHROMBIN TIME: CPT

## 2020-05-05 PROCEDURE — C1894 INTRO/SHEATH, NON-LASER: HCPCS

## 2020-05-05 PROCEDURE — 2709999900 HC NON-CHARGEABLE SUPPLY

## 2020-05-05 PROCEDURE — 7100000001 HC PACU RECOVERY - ADDTL 15 MIN

## 2020-05-05 PROCEDURE — C1887 CATHETER, GUIDING: HCPCS

## 2020-05-05 RX ORDER — ACETAMINOPHEN 325 MG/1
650 TABLET ORAL EVERY 4 HOURS PRN
Status: DISCONTINUED | OUTPATIENT
Start: 2020-05-05 | End: 2020-05-06 | Stop reason: HOSPADM

## 2020-05-05 RX ORDER — SODIUM CHLORIDE 0.9 % (FLUSH) 0.9 %
10 SYRINGE (ML) INJECTION PRN
Status: DISCONTINUED | OUTPATIENT
Start: 2020-05-05 | End: 2020-05-06 | Stop reason: HOSPADM

## 2020-05-05 RX ORDER — SODIUM CHLORIDE 0.9 % (FLUSH) 0.9 %
10 SYRINGE (ML) INJECTION EVERY 12 HOURS SCHEDULED
Status: DISCONTINUED | OUTPATIENT
Start: 2020-05-05 | End: 2020-05-06 | Stop reason: HOSPADM

## 2020-05-05 RX ORDER — SODIUM CHLORIDE 9 MG/ML
INJECTION, SOLUTION INTRAVENOUS CONTINUOUS
Status: DISCONTINUED | OUTPATIENT
Start: 2020-05-05 | End: 2020-05-06 | Stop reason: HOSPADM

## 2020-05-05 RX ADMIN — SODIUM CHLORIDE: 9 INJECTION, SOLUTION INTRAVENOUS at 10:23

## 2020-05-05 NOTE — H&P
mg daily or as directed by Gal Fox 12/14/18  Yes Qian Lynn,    atenolol (TENORMIN) 25 MG tablet TAKE 1 TABLET TWICE DAILY 9/14/18  Yes SARAH Anderson CNP   citalopram (CELEXA) 40 MG tablet TAKE 1 TABLET EVERY DAY 2/26/18  Yes SARAH Anderson CNP   donepezil (ARICEPT) 10 MG tablet TAKE 1 TABLET EVERY DAY 12/26/17  Yes Demar Trammell MD   atorvastatin (LIPITOR) 40 MG tablet TAKE 1 TABLET EVERY DAY 12/26/17  Yes Demar Trammell MD   losartan (COZAAR) 100 MG tablet TAKE 1 TABLET EVERY DAY 12/26/17  Yes Demar Trammell MD   B Complex Vitamins (VITAMIN B COMPLEX PO) Take 1 tablet by mouth daily   Yes Historical Provider, MD   etodolac (LODINE) 500 MG tablet TAKE ONE TABLET BY MOUTH TWICE DAILY  5/16/19   SARAH Anderson CNP   buPROPion (WELLBUTRIN SR) 150 MG extended release tablet Take 150 mg by mouth 2 times daily    Historical Provider, MD   etodolac (LODINE) 500 MG tablet Take 500 mg by mouth 2 times daily    Historical Provider, MD   fluticasone (FLONASE) 50 MCG/ACT nasal spray USE 2 SPRAYS IN EACH NOSTRIL EVERY DAY 6/28/16   SARAH Anderson CNP   SYMBICORT 160-4.5 MCG/ACT AERO Inhale 2 puffs into the lungs daily 3/15/16   Historical Provider, MD       No Known Allergies      REVIEW OF SYSTEMS:     A detailed review of system was performed as already noted and is otherwise as above. PHYSICAL EXAM:     Vitals:    05/05/20 1011   BP: (!) 187/77   Pulse: 57   Resp: 16   SpO2: 98%        Constitutional and General Appearance: alert, cooperative, no distress and appears stated age  HEENT: PERRL, no cervical lymphadenopathy. No masses palpable. Normal oral mucosa  Respiratory:  · Normal excursion and expansion without use of accessory muscles  · Resp Auscultation: Good respiratory effort. No for increased work of breathing.  On auscultation: clear to auscultation bilaterally  Cardiovascular:  · The apical impulse is not detail with the patient. The patient agrees to proceed with the procedure, verbalizes understanding and signed consent.        Jennifer Weinberg MD  Fellow, [address]

## 2020-05-05 NOTE — OP NOTE
[facility]        Date:   5/5/2020  Patient name:  Chris Moses  Date of admission:  5/5/2020  9:45 AM  MRN:   [id number]  YOB: 1950    CARDIAC CATHETERIZATION    Operators:  Wolfgang House MD    CV Fellow: Charo Nolan M.D. Procedure performed:       [x] Left Heart Catheterization. [] Graft Angiography. [x] Left Ventriculography. [] Right Heart Catheterization. [x] Coronary Angiography. [] Aortic Valve Studies. [x] PCI:      [] Other:       Pre Procedure Conscious Sedation Data:    ASA Class:    [] I [] II [x] III [] IV    Mallampati Class:  [] I [] II [x] III [] IV      Indication:  [] STEMI      [] + Stress test  [] ACS      [] + EKG Changes  [] Non Q MI       [] Significant Risk Factors  [] Recurrent Angina             [] Diabetes Mellitus    [] New LBBB      [] Uncontrolled HTN. [] CHF / Low EF changes     [x] Abnormal CTA / Ca Score  [] Other:     Procedure:  Access:  [] Femoral artery  [x] Radial  artery       [x] Right   [] Left    Procedure: After informed consent was obtained with explanation of the risks and benefits, patient was brought to the cath lab. The access area was prepped and draped in sterile fashion. 1% lidocaine was used for local block. The artery was cannulated with 6  Fr sheath with brisk arterial blood return. The side port was frequently flushed and aspirated with normal saline. Estimated blood loss: 10 ml    Findings:     LMCA: Normal 0% stenosis.     LAD: Mid area stenosis 40-50%  D: ostial 40% stenosis     LCx: Mild irregularities 20-30%.     RCA: Proximal 30% stenosis  Distal 80% calcified stenosis  PDA and PL branches with minimal disease  RCA is dominant vessel       Lesion on Mid RCA: Distal subsection. 90% stenosis 12 mm length reduced to    5%. Pre procedure JORGE II flow was noted. Post Procedure JORGE III flow    was present. Good runoff was present. The lesion was diagnosed as High    Risk (C).        Devices used Indeterminate  [] Unavailable     If Positive/ Risk / Extent of Ischemia:       [] Low  [] Intermediate         [] High  [] Unavailable      Cardiac CTA Performed:     [x] Yes    [] No      Results   [x] CAD   [] Non obstructive CAD      [] No CAD   [] Uncertain      [] Unknown   [] Structural Disease. Pre Procedure Medications:   [x] Yes    [] No         [] ASA  [x] Beta Blockers      [] Nitrate  [] Ca Channel Blockers      [] Ranolazine  [x] Statin       [] Plavix/Others antiplatelets          Kecia Dunne MD  Fellow, [address] [address]            I have reviewed the case / procedure with resident / fellow  I have examined the patient personally  Patient agree with treatment plan, correction innotes was made as appropriate, and discussed final arrangement based on  my evaluation and exam.    Risk and benefit of procedure if planned were explained in details. Procedure was performed by me, with all aspect of the procedure being done using standard protocol. Note was modified based on my own assessment and treatment.     Moisés Barnett MD  [facility]

## 2020-05-05 NOTE — PROGRESS NOTES
2-3 ml. Of air released from TR band every 15 minutes per protocol . No bleeding or hematoma noted. Patient voices no complaints.

## 2020-06-12 ENCOUNTER — HOSPITAL ENCOUNTER (OUTPATIENT)
Age: 70
Setting detail: SPECIMEN
Discharge: HOME | End: 2020-06-12
Payer: MEDICARE

## 2020-06-12 LAB
INR BLD: 3.2
PROTHROMBIN TIME: 31.7 SEC (ref 9–12)

## 2020-06-22 ENCOUNTER — HOSPITAL ENCOUNTER (OUTPATIENT)
Age: 70
Setting detail: SPECIMEN
Discharge: HOME | End: 2020-06-22
Payer: MEDICARE

## 2020-06-22 LAB
INR BLD: 2
PROTHROMBIN TIME: 20.8 SEC (ref 9–12)

## 2020-06-29 ENCOUNTER — HOSPITAL ENCOUNTER (OUTPATIENT)
Age: 70
Setting detail: SPECIMEN
Discharge: HOME | End: 2020-06-29
Payer: MEDICARE

## 2020-06-29 LAB
INR BLD: 1.6
PROTHROMBIN TIME: 16.7 SEC (ref 9–12)

## 2020-07-09 ENCOUNTER — HOSPITAL ENCOUNTER (OUTPATIENT)
Age: 70
Setting detail: SPECIMEN
Discharge: HOME | End: 2020-07-09
Payer: MEDICARE

## 2020-07-09 LAB
FERRITIN: 183 UG/L (ref 30–400)
INR BLD: 2.1
IRON SATURATION: 24 % (ref 20–55)
IRON: 65 UG/DL (ref 59–158)
PROTHROMBIN TIME: 21.4 SEC (ref 9–12)
TOTAL IRON BINDING CAPACITY: 276 UG/DL (ref 250–450)
UNSATURATED IRON BINDING CAPACITY: 211 UG/DL (ref 112–347)

## 2020-08-04 ENCOUNTER — HOSPITAL ENCOUNTER (OUTPATIENT)
Age: 70
Setting detail: SPECIMEN
Discharge: HOME | End: 2020-08-04
Payer: MEDICARE

## 2020-08-04 LAB
INR BLD: 2.1
PROTHROMBIN TIME: 21.5 SEC (ref 9–12)

## 2020-08-30 ENCOUNTER — HOSPITAL ENCOUNTER (OUTPATIENT)
Dept: PREADMISSION TESTING | Age: 70
Setting detail: SPECIMEN
Discharge: HOME | End: 2020-09-03
Payer: MEDICARE

## 2020-08-30 PROCEDURE — U0003 INFECTIOUS AGENT DETECTION BY NUCLEIC ACID (DNA OR RNA); SEVERE ACUTE RESPIRATORY SYNDROME CORONAVIRUS 2 (SARS-COV-2) (CORONAVIRUS DISEASE [COVID-19]), AMPLIFIED PROBE TECHNIQUE, MAKING USE OF HIGH THROUGHPUT TECHNOLOGIES AS DESCRIBED BY CMS-2020-01-R: HCPCS

## 2020-08-31 ENCOUNTER — HOSPITAL ENCOUNTER (EMERGENCY)
Facility: CLINIC | Age: 70
Discharge: HOME | End: 2020-08-31
Attending: EMERGENCY MEDICINE
Payer: MEDICARE

## 2020-08-31 VITALS
RESPIRATION RATE: 16 BRPM | TEMPERATURE: 98.5 F | HEART RATE: 69 BPM | OXYGEN SATURATION: 97 % | BODY MASS INDEX: 36.1 KG/M2 | SYSTOLIC BLOOD PRESSURE: 154 MMHG | WEIGHT: 230 LBS | HEIGHT: 67 IN | DIASTOLIC BLOOD PRESSURE: 71 MMHG

## 2020-08-31 LAB
INR BLD: 4.8
PROTHROMBIN TIME: 48.9 SEC (ref 9.4–12.6)

## 2020-08-31 PROCEDURE — 99282 EMERGENCY DEPT VISIT SF MDM: CPT

## 2020-08-31 PROCEDURE — 85610 PROTHROMBIN TIME: CPT

## 2020-08-31 PROCEDURE — 6370000000 HC RX 637 (ALT 250 FOR IP): Performed by: EMERGENCY MEDICINE

## 2020-08-31 PROCEDURE — 36415 COLL VENOUS BLD VENIPUNCTURE: CPT

## 2020-08-31 RX ADMIN — Medication 1 EACH: at 01:27

## 2020-08-31 NOTE — ED PROVIDER NOTES
eMERGENCY dEPARTMENT eNCOUnter      Pt Name: Chelsea Castorena  MRN: [id number]  [place] 1950  Date of evaluation: 8/31/2020      CHIEF COMPLAINT       Chief Complaint   Patient presents with    Coagulation Disorder         HISTORY OF PRESENT ILLNESS    Chelsea Castorena is a 69 y.o. male who presents bleeding. Patient states uncertain what happened, but started noticing small amount of bleeding from his tongue, starting at summer between 5 and 6. This continued throughout the time. He has tried ice to the area. He is currently on Coumadin         REVIEW OF SYSTEMS       , Positive bleeding. Negative for headache, shortness of breath, chest pain     PAST MEDICAL HISTORY    has a past medical history of Acute pansinusitis, Allergic rhinitis, Asthma, Asthma, Dementia without behavioral disturbance ([address] Gastroesophageal reflux, History of DVT (deep vein thrombosis), Hx of blood clots, Hyperlipidemia, Hyperlipidemia LDL goal < 100, Hypertension, Hypertension, benign, Major depression, chronic, Osteoarthritis, Pulmonary emboli (HCC), Severe obesity (BMI 35.0-35.9 with comorbidity) ([facility] [address] and Sleep apnea. SURGICAL HISTORY      has a past surgical history that includes Total shoulder arthroplasty; joint replacement; Tonsillectomy; Ankle Fusion; Carpal tunnel release; and Cardiac catheterization (05/05/2020).     CURRENT MEDICATIONS       Discharge Medication List as of 8/31/2020  2:01 AM      CONTINUE these medications which have NOT CHANGED    Details   ticagrelor (BRILINTA) 90 MG TABS tablet Take 1 tablet by mouth 2 times daily, Disp-60 tablet, R-3Normal      !! etodolac (LODINE) 500 MG tablet TAKE ONE TABLET BY MOUTH TWICE DAILY , Disp-180 tablet, R-0Normal      pantoprazole (PROTONIX) 20 MG tablet Take 20 mg by mouth dailyHistorical Med      buPROPion (WELLBUTRIN SR) 150 MG extended release tablet Take 150 mg by mouth 2 times dailyHistorical Med      Multiple Vitamins-Minerals (THERAPEUTIC MULTIVITAMIN-MINERALS) tablet Take 1 tablet by mouth dailyHistorical Med      acetaminophen (TYLENOL) 500 MG tablet Take 1,000 mg by mouth 3 times daily as needed for PainHistorical Med      !! etodolac (LODINE) 500 MG tablet Take 500 mg by mouth 2 times dailyHistorical Med      warfarin (COUMADIN) 5 MG tablet Take 5 mg daily or as directed by [address], Disp-200 tablet, R-2Normal      atenolol (TENORMIN) 25 MG tablet TAKE 1 TABLET TWICE DAILY, Disp-180 tablet, R-3Normal      citalopram (CELEXA) 40 MG tablet TAKE 1 TABLET EVERY DAY, Disp-90 tablet, R-3Normal      donepezil (ARICEPT) 10 MG tablet TAKE 1 TABLET EVERY DAY, Disp-90 tablet, R-3Normal      atorvastatin (LIPITOR) 40 MG tablet TAKE 1 TABLET EVERY DAY, Disp-90 tablet, R-3Normal      losartan (COZAAR) 100 MG tablet TAKE 1 TABLET EVERY DAY, Disp-90 tablet, R-3Normal      fluticasone (FLONASE) 50 MCG/ACT nasal spray USE 2 SPRAYS IN EACH NOSTRIL EVERY DAY, Disp-3 Bottle, R-3      SYMBICORT 160-4.5 MCG/ACT AERO Inhale 2 puffs into the lungs daily, DENISSE      B Complex Vitamins (VITAMIN B COMPLEX PO) Take 1 tablet by mouth daily       ! ! - Potential duplicate medications found. Please discuss with provider. ALLERGIES     has No Known Allergies. FAMILY HISTORY     He indicated that his mother is alive. He indicated that his father is . family history includes Heart Disease in his father; High Blood Pressure in his father. SOCIAL HISTORY      reports that he has quit smoking. He has never used smokeless tobacco. He reports that he does not drink alcohol or use drugs. PHYSICAL EXAM     INITIAL VITALS:  height is 5' 7\" (1.702 m) and weight is 104.3 kg (230 lb). His oral temperature is 98.5 °F (36.9 °C). His blood pressure is 154/71 (abnormal) and his pulse is 69. His respiration is 16 and oxygen saturation is 97%. .  General: Patient nontoxic appearing, obese male in no apparent distress. HEENT: Head is atraumatic. Examination of mouth reveals a small area of continuous oozing of blood from the tip of his tongue. No lesion is noted. Respiratory: Patient is nonlabored respiration    DIFFERENTIAL DIAGNOSIS/ MDM:     Coagulopathy    DIAGNOSTIC RESULTS       RADIOLOGY:   I directly visualized the following  images and reviewed the radiologist interpretations:  No orders to display         LABS:  Labs Reviewed   PROTIME-INR - Abnormal; Notable for the following components:       Result Value    Protime 48.9 (*)     INR 4.8 (*)     All other components within normal limits         EMERGENCY DEPARTMENT COURSE:   Vitals:    Vitals:    08/31/20 0106 08/31/20 0136   BP: (!) 180/88 (!) 154/71   Pulse: 74 69   Resp: 16 16   Temp: 98.5 °F (36.9 °C)    TempSrc: Oral    SpO2: 97% 97%   Weight: 104.3 kg (230 lb)    Height: 5' 7\" (1.702 m)      -------------------------  BP: (!) 154/71, Temp: 98.5 °F (36.9 °C), Pulse: 69, Resp: 16    Orders Placed This Encounter   Medications    silver nitrate applicators applicator 1 each           Re-evaluation Notes    Labs showed an elevated INR at 4.8, silver nitrate was applied to the area. He was observed. There is no further bleeding. He will be discharged with early follow-up. Instructed to not take his Coumadin in morning, call his doctor for Coumadin restarting and any adjustments. Return if worse        FINAL IMPRESSION      1. Coagulopathy [facility])          DISPOSITION/PLAN   DISPOSITION Decision To Discharge 08/31/2020 02:01:35 AM      Condition on Disposition    Stable    PATIENT REFERRED TO:  Nathalia Low MD  [address] [facility]  [phone]    In 1 day        DISCHARGE MEDICATIONS:  Discharge Medication List as of 8/31/2020  2:01 AM          (Please note that portions of this note were completed with a voice recognition program.  Efforts were made to edit the dictations but occasionally words are mis-transcribed.)    Dempsey MD, F.A.C.E.P.   Attending

## 2020-09-01 LAB — SARS-COV-2, NAA: NOT DETECTED

## 2020-09-02 ENCOUNTER — TELEPHONE (OUTPATIENT)
Dept: PRIMARY CARE CLINIC | Age: 70
End: 2020-09-02

## 2020-09-03 ENCOUNTER — HOSPITAL ENCOUNTER (OUTPATIENT)
Dept: NEUROLOGY | Age: 70
Discharge: HOME | End: 2020-09-03
Payer: MEDICARE

## 2020-09-03 PROCEDURE — 94060 EVALUATION OF WHEEZING: CPT

## 2020-09-03 PROCEDURE — 94729 DIFFUSING CAPACITY: CPT

## 2020-09-03 PROCEDURE — 6370000000 HC RX 637 (ALT 250 FOR IP): Performed by: INTERNAL MEDICINE

## 2020-09-03 PROCEDURE — 94727 GAS DIL/WSHOT DETER LNG VOL: CPT

## 2020-09-03 RX ORDER — ALBUTEROL SULFATE 90 UG/1
2 AEROSOL, METERED RESPIRATORY (INHALATION) ONCE
Status: COMPLETED | OUTPATIENT
Start: 2020-09-03 | End: 2020-09-03

## 2020-09-03 RX ADMIN — ALBUTEROL SULFATE 2 PUFF: 90 AEROSOL, METERED RESPIRATORY (INHALATION) at 17:42

## 2020-09-04 ENCOUNTER — HOSPITAL ENCOUNTER (OUTPATIENT)
Age: 70
Setting detail: SPECIMEN
Discharge: HOME | End: 2020-09-04
Payer: MEDICARE

## 2020-09-04 LAB
INR BLD: 2
PROTHROMBIN TIME: 20.5 SEC (ref 9–12)

## 2020-09-04 NOTE — PROCEDURES
[address] [facility] [address]                [address] [place] [address], [address] [place] [address]                               PULMONARY FUNCTION    PATIENT NAME: Khadra Trotter                    :        1950  MED REC NO:   [id number]                             ROOM:  ACCOUNT NO:   [de-identified]                           ADMIT DATE: 2020  PROVIDER:     Pedrito Eduardo    DATE OF PROCEDURE:  2020    TYPE OF STUDY:  Complete PFT. RESULTS:  The patient had FEV1 of 2.63 which is normal at 91% predicted,  FVC was 3.33 which is normal at 87% predicted. FEV1/FVC was 79. The  mid-flows were increased to 110% predicted. The residual volume was  mildly reduced at 75% predicted. Total lung capacity was normal at 83%  predicted. Diffusion was mildly reduced at 74% predicted. Following  bronchodilators, there was near significant improvement in FEV1 only. IMPRESSION:  No obstruction or restriction. Mild diffusion impairment  and near significant improvement in FEV1 following bronchodilators. Clinical correlation is recommended.         Christiano Calix    D: 2020 13:05:53       T: 2020 13:15:28     [email or web address]  Job#: [id number]     Doc#: [id number]    CC:

## 2020-09-16 ENCOUNTER — HOSPITAL ENCOUNTER (OUTPATIENT)
Age: 70
Setting detail: SPECIMEN
Discharge: HOME | End: 2020-09-16
Payer: MEDICARE

## 2020-09-16 LAB
ABSOLUTE EOS #: 0.35 K/UL (ref 0–0.44)
ABSOLUTE IMMATURE GRANULOCYTE: <0.03 K/UL (ref 0–0.3)
ABSOLUTE LYMPH #: 1.42 K/UL (ref 1.1–3.7)
ABSOLUTE MONO #: 0.59 K/UL (ref 0.1–1.2)
ALBUMIN SERPL-MCNC: 4.1 G/DL (ref 3.5–5.2)
ALBUMIN/GLOBULIN RATIO: 1.8 (ref 1–2.5)
ALP BLD-CCNC: 96 U/L (ref 40–129)
ALT SERPL-CCNC: 20 U/L (ref 5–41)
ANION GAP SERPL CALCULATED.3IONS-SCNC: 11 MMOL/L (ref 9–17)
AST SERPL-CCNC: 23 U/L
BASOPHILS # BLD: 2 % (ref 0–2)
BASOPHILS ABSOLUTE: 0.08 K/UL (ref 0–0.2)
BILIRUB SERPL-MCNC: 0.47 MG/DL (ref 0.3–1.2)
BUN BLDV-MCNC: 17 MG/DL (ref 8–23)
BUN/CREAT BLD: ABNORMAL (ref 9–20)
CALCIUM SERPL-MCNC: 9.1 MG/DL (ref 8.6–10.4)
CHLORIDE BLD-SCNC: 106 MMOL/L (ref 98–107)
CO2: 22 MMOL/L (ref 20–31)
CREAT SERPL-MCNC: 1.09 MG/DL (ref 0.7–1.2)
DIFFERENTIAL TYPE: ABNORMAL
EOSINOPHILS RELATIVE PERCENT: 7 % (ref 1–4)
GFR AFRICAN AMERICAN: >60 ML/MIN
GFR NON-AFRICAN AMERICAN: >60 ML/MIN
GFR SERPL CREATININE-BSD FRML MDRD: ABNORMAL ML/MIN/{1.73_M2}
GFR SERPL CREATININE-BSD FRML MDRD: ABNORMAL ML/MIN/{1.73_M2}
GLUCOSE FASTING: 147 MG/DL (ref 70–99)
HCT VFR BLD CALC: 41.5 % (ref 40.7–50.3)
HEMOGLOBIN: 13.7 G/DL (ref 13–17)
IMMATURE GRANULOCYTES: 0 %
INR BLD: 2.1
LYMPHOCYTES # BLD: 26 % (ref 24–43)
MCH RBC QN AUTO: 29.4 PG (ref 25.2–33.5)
MCHC RBC AUTO-ENTMCNC: 33 G/DL (ref 28.4–34.8)
MCV RBC AUTO: 89.1 FL (ref 82.6–102.9)
MONOCYTES # BLD: 11 % (ref 3–12)
NRBC AUTOMATED: 0 PER 100 WBC
PDW BLD-RTO: 14.6 % (ref 11.8–14.4)
PLATELET # BLD: 187 K/UL (ref 138–453)
PLATELET ESTIMATE: ABNORMAL
PMV BLD AUTO: 11.2 FL (ref 8.1–13.5)
POTASSIUM SERPL-SCNC: 4.5 MMOL/L (ref 3.7–5.3)
PROTHROMBIN TIME: 21 SEC (ref 9–12)
RBC # BLD: 4.66 M/UL (ref 4.21–5.77)
RBC # BLD: ABNORMAL 10*6/UL
SEG NEUTROPHILS: 54 % (ref 36–65)
SEGMENTED NEUTROPHILS ABSOLUTE COUNT: 2.94 K/UL (ref 1.5–8.1)
SODIUM BLD-SCNC: 139 MMOL/L (ref 135–144)
TOTAL PROTEIN: 6.4 G/DL (ref 6.4–8.3)
WBC # BLD: 5.4 K/UL (ref 3.5–11.3)
WBC # BLD: ABNORMAL 10*3/UL

## 2020-09-25 ENCOUNTER — TELEPHONE (OUTPATIENT)
Dept: ORTHOPEDIC SURGERY | Age: 70
End: 2020-09-25

## 2020-09-25 ENCOUNTER — OFFICE VISIT (OUTPATIENT)
Dept: ORTHOPEDIC SURGERY | Age: 70
End: 2020-09-25
Payer: MEDICARE

## 2020-09-25 PROCEDURE — 99213 OFFICE O/P EST LOW 20 MIN: CPT | Performed by: ORTHOPAEDIC SURGERY

## 2020-09-25 PROCEDURE — 1123F ACP DISCUSS/DSCN MKR DOCD: CPT | Performed by: ORTHOPAEDIC SURGERY

## 2020-09-25 PROCEDURE — 1036F TOBACCO NON-USER: CPT | Performed by: ORTHOPAEDIC SURGERY

## 2020-09-25 PROCEDURE — G8417 CALC BMI ABV UP PARAM F/U: HCPCS | Performed by: ORTHOPAEDIC SURGERY

## 2020-09-25 PROCEDURE — G8428 CUR MEDS NOT DOCUMENT: HCPCS | Performed by: ORTHOPAEDIC SURGERY

## 2020-09-25 PROCEDURE — 3017F COLORECTAL CA SCREEN DOC REV: CPT | Performed by: ORTHOPAEDIC SURGERY

## 2020-09-25 PROCEDURE — 4040F PNEUMOC VAC/ADMIN/RCVD: CPT | Performed by: ORTHOPAEDIC SURGERY

## 2020-09-25 NOTE — PROGRESS NOTES
Keena Hector M.D.            [address] [place] [address]., [address] [facility],[address], [address] [facility]           Dept Phone: [phone]           Dept Fax:  [phone]           [place]          Dept Phone: [phone]           Dept Fax:  [phone]      Chief Compliant:  Chief Complaint   Patient presents with    Pain     Rt shoulder replacement 7 yrs        History of Present Illness: This is a 69 y.o. male who presents to the clinic today for evaluation / follow up of right parascapular and shoulder pain. She also has significant low back complaints as well as symptoms compatible with some neurogenic claudication patient is status post right total shoulder arthroplasty that was performed at least 8 to 10 years ago. He is complaining of nocturnal pain where it has difficulty sleeping although other times he can have any problems at all with this. He indicates most of the area though however in his paraspinal area    Patient also has a significant problems with his low back. He said previous surgeries per Dr. Fawad Ramachandran. He said he does have a spinal cord stimulator as well. He has not had a recent MRI however his lumbar spine. He gives a lot of symptoms compatible neurogenic claudication. Review of Systems   Constitutional: Negative for fever, chills, sweats. Eyes: Negative for changes in vision, or pain. HENT: Negative for ear ache, epistaxis, or sore throat. Respiratory/Cardio: Negative for Chest pain, palpitations, SOB, or cough. Gastrointestinal: Negative for abdominal pain, N/V/D. Genitourinary: Negative for dysuria, frequency, urgency, or hematuria. Neurological: Negative for headache, numbness, or weakness. Integumentary: Negative for rash, itching, laceration, or abrasion.    Musculoskeletal: Positive for Pain (Rt shoulder replacement 7 yrs)       Physical Exam:  Constitutional: Patient is oriented to person, place, and time. Patient appears well-developed and well nourished. HENT: Negative otherwise noted  Head: Normocephalic and Atraumatic  Nose: Normal  Eyes: Conjunctivae and EOM are normal  Neck: Normal range of motion Neck supple. Respiratory/Cardio: Effort normal. No respiratory distress. Musculoskeletal: Examination limited to the patient's right shoulder. Patient has pretty much pain-free range of motion of her shoulder both passively. He has good active strength in resisted abduction and external rotation. He does have a lot of tenderness in the right trapezial area and along and tenderness along the cervical spine. No obvious crepitus or grinding about the patient's shoulder that is painful. Neurological: Patient is alert and oriented to person, place, and time. Normal strenght. No sensory deficit. Skin: Skin is warm and dry  Psychiatric: Behavior is normal. Thought content normal.  Nursing note and vitals reviewed. Labs and Imaging:     XR taken today:  Xr Shoulder Right (min 2 Views)    Result Date: 9/25/2020  X-rays taken today reviewed by me show AP and outlet views of the patient's right shoulder. Patient is status post right total shoulder arthroplasty. Components appear to be in good position on both views. No evidence for lucency and/or loosening of either the humeral or glenoid component          Orders Placed This Encounter   Procedures    XR SHOULDER RIGHT (MIN 2 VIEWS)     Standing Status:   Future     Number of Occurrences:   1     Standing Expiration Date:   9/25/2021     Scheduling Instructions:      AP & outlet    MRI LUMBAR SPINE WO CONTRAST     Standing Status:   Future     Standing Expiration Date:   9/25/2021       Assessment and Plan:  1. Right shoulder pain, unspecified chronicity    2. History of right shoulder replacement    3.  Spinal stenosis of lumbar region, unspecified whether neurogenic of education: Not on file    Highest education level: Not on file   Occupational History    Occupation: packaging     Employer: [employer] Financial resource strain: Not on file    Food insecurity     Worry: Not on file     Inability: Not on file   [employer] needs     Medical: Not on file     Non-medical: Not on file   Tobacco Use    Smoking status: Former Smoker    Smokeless tobacco: Never Used   Substance and Sexual Activity    Alcohol use: No     Alcohol/week: 0.0 standard drinks     Comment: very rare    Drug use: No    Sexual activity: Not on file   Lifestyle    Physical activity     Days per week: Not on file     Minutes per session: Not on file    Stress: Not on file   Relationships    Social connections     Talks on phone: Not on file     Gets together: Not on file     Attends Alevism service: Not on file     Active member of club or organization: Not on file     Attends meetings of clubs or organizations: Not on file     Relationship status: Not on file    Intimate partner violence     Fear of current or ex partner: Not on file     Emotionally abused: Not on file     Physically abused: Not on file     Forced sexual activity: Not on file   Other Topics Concern    Not on file   Social History Narrative    Not on file     Past Medical History:   Diagnosis Date    Acute pansinusitis     Allergic rhinitis     Asthma     Asthma     Dementia without behavioral disturbance ([facility]     Gastroesophageal reflux     History of DVT (deep vein thrombosis) 8/27/2015    Hx of blood clots     Hyperlipidemia     Hyperlipidemia LDL goal < 100     Hypertension     Hypertension, benign     Major depression, chronic 8/27/2015    Osteoarthritis     Pulmonary emboli ([facility] 2007 approx.     bilateral    Severe obesity (BMI 35.0-35.9 with comorbidity) ([facility] 8/27/2015    Sleep apnea      Past Surgical History:   Procedure Laterality Date    ANKLE FUSION      CARDIAC CATHETERIZATION  05/05/2020    CARPAL TUNNEL RELEASE      JOINT REPLACEMENT      SHOULDER ARTHROPLASTY      TONSILLECTOMY       Family History   Problem Relation Age of Onset    Heart Disease Father     High Blood Pressure Father    Plan  I discussed with the patient that I do not see something inherently wrong with his right total shoulder arthroplasty. He does have what appears to be severe degenerative changes of his lower cervical spine that I could appreciate on his shoulder x-rays. I did not do a dedicated cervical series. Patient has significant problems with his low back and most likely due to neurogenic claudication. I am would take the liberty of ordering a CT myelogram of his lumbar spine. Patient is considering being evaluated at the [facility] clinic for this. Provider Attestation:  Layton Juarez, personally performed the services described in this documentation. All medical record entries made by the scribe were at my direction and in my presence. I have reviewed the chart and discharge instructions and agree that the records reflect my personal performance and is accurate and complete. Naresh Choudhary MD. 09/25/20      Please note that this chart was generated using voice recognition Dragon dictation software. Although every effort was made to ensure the accuracy of this automated transcription, some errors in transcription may have occurred.

## 2020-09-25 NOTE — TELEPHONE ENCOUNTER
Gerber Ash saw him and noticed cervical narrowing on his shoulder XR. He is referring him to you to check out that issue. Gerber Ash also ordered a CT Myelogram of his lumbar spine because the patient was going to [address] [place] [address] to get his low back checked out. The patient is wanting to know if you want him to get a CT on his cervical spine as well before you see him? His apt with you is on 10/1.

## 2020-09-28 ENCOUNTER — TELEPHONE (OUTPATIENT)
Dept: ORTHOPEDIC SURGERY | Age: 70
End: 2020-09-28

## 2020-09-29 NOTE — TELEPHONE ENCOUNTER
He going to have it done anyway. He believes this is all time sensitive to get into [facility] for care. He states he will go ahead with the testing and if it needs to be repeated then so be it. He will follow up as previously scheduled with herminia sanchez/arnold.

## 2020-10-02 ENCOUNTER — HOSPITAL ENCOUNTER (OUTPATIENT)
Dept: CT IMAGING | Age: 70
Discharge: HOME | End: 2020-10-04
Payer: MEDICARE

## 2020-10-02 ENCOUNTER — HOSPITAL ENCOUNTER (OUTPATIENT)
Dept: INTERVENTIONAL RADIOLOGY/VASCULAR | Age: 70
Discharge: HOME | End: 2020-10-04
Payer: MEDICARE

## 2020-10-02 VITALS
HEART RATE: 67 BPM | DIASTOLIC BLOOD PRESSURE: 80 MMHG | OXYGEN SATURATION: 95 % | SYSTOLIC BLOOD PRESSURE: 160 MMHG | WEIGHT: 241 LBS | RESPIRATION RATE: 16 BRPM | TEMPERATURE: 97.2 F | BODY MASS INDEX: 38.73 KG/M2 | HEIGHT: 66 IN

## 2020-10-02 LAB
INR BLD: 1.4
PARTIAL THROMBOPLASTIN TIME: 32.8 SEC (ref 23.9–33.8)
PLATELET # BLD: 176 K/UL (ref 138–453)
PROTHROMBIN TIME: 16.7 SEC (ref 11.5–14.2)

## 2020-10-02 PROCEDURE — 62304 MYELOGRAPHY LUMBAR INJECTION: CPT | Performed by: RADIOLOGY

## 2020-10-02 PROCEDURE — 2580000003 HC RX 258: Performed by: RADIOLOGY

## 2020-10-02 PROCEDURE — 85730 THROMBOPLASTIN TIME PARTIAL: CPT

## 2020-10-02 PROCEDURE — 85049 AUTOMATED PLATELET COUNT: CPT

## 2020-10-02 PROCEDURE — 7100000000 HC PACU RECOVERY - FIRST 15 MIN

## 2020-10-02 PROCEDURE — 2709999900 IR MYELOGRAM LUMBOSACRAL

## 2020-10-02 PROCEDURE — 7100000001 HC PACU RECOVERY - ADDTL 15 MIN

## 2020-10-02 PROCEDURE — 85610 PROTHROMBIN TIME: CPT

## 2020-10-02 PROCEDURE — 6360000004 HC RX CONTRAST MEDICATION: Performed by: RADIOLOGY

## 2020-10-02 PROCEDURE — 72132 CT LUMBAR SPINE W/DYE: CPT

## 2020-10-02 RX ORDER — ACETAMINOPHEN 325 MG/1
650 TABLET ORAL EVERY 4 HOURS PRN
Status: DISCONTINUED | OUTPATIENT
Start: 2020-10-02 | End: 2020-10-05 | Stop reason: HOSPADM

## 2020-10-02 RX ORDER — SODIUM CHLORIDE 9 MG/ML
INJECTION, SOLUTION INTRAVENOUS CONTINUOUS
Status: CANCELLED | OUTPATIENT
Start: 2020-10-02

## 2020-10-02 RX ORDER — CARVEDILOL 6.25 MG/1
1 TABLET ORAL DAILY
COMMUNITY
Start: 2020-08-25

## 2020-10-02 RX ORDER — SODIUM CHLORIDE 9 MG/ML
INJECTION, SOLUTION INTRAVENOUS CONTINUOUS
Status: DISCONTINUED | OUTPATIENT
Start: 2020-10-02 | End: 2020-10-05 | Stop reason: HOSPADM

## 2020-10-02 RX ORDER — CLOPIDOGREL BISULFATE 75 MG/1
75 TABLET ORAL DAILY
COMMUNITY

## 2020-10-02 RX ORDER — ACETAMINOPHEN AND CODEINE PHOSPHATE 300; 30 MG/1; MG/1
1 TABLET ORAL EVERY 8 HOURS PRN
COMMUNITY

## 2020-10-02 RX ADMIN — IOPAMIDOL 12 ML: 408 INJECTION, SOLUTION INTRATHECAL at 13:46

## 2020-10-02 RX ADMIN — SODIUM CHLORIDE: 9 INJECTION, SOLUTION INTRAVENOUS at 12:48

## 2020-10-02 ASSESSMENT — PAIN SCALES - GENERAL
PAINLEVEL_OUTOF10: 0

## 2020-10-02 ASSESSMENT — PAIN - FUNCTIONAL ASSESSMENT: PAIN_FUNCTIONAL_ASSESSMENT: 0-10

## 2020-10-02 NOTE — BRIEF OP NOTE
Brief Postoperative Note    Ricardo Peraza  YOB: 1950  [id number]    Pre-operative Diagnosis: Back pain    Post-operative Diagnosis: Same    Procedure: Fluoro guided LP    Anesthesia: Local    Surgeons/Assistants: Mc    Estimated Blood Loss: less than 50     Complications: None    Specimens: Was Not Obtained    Electronically signed by Shereen Garcia MD on 10/2/2020 at 2:00 PM

## 2020-10-02 NOTE — H&P
History and Physical Update    Pt Name: Jacinto Conley  MRN: [id number]  YOB: 1950  Date of evaluation: 10/2/2020      [x] I have [facility]OUGH  which meets the criteria for an Interval History and Physical note COPIED BELOW. .    [x] I have examined  Virginie Rascon  There are no changes to the patient who is scheduled for a CT GUIDED MYELOGRAM .  The patient denies new health changes, fever, chills, wheezing, cough, NO CHEST PAIN , NO FLU SYMPTOMS NO OPEN DRAINING WOUNDS. HE TAKES PLAVIX  LAST DOSE THIS AM, HS TAKES COUMADIN, LAST DOSE WAS 9/28/2020       Vital signs: BP (!) 159/74   Pulse 61   Temp 97.9 °F (36.6 °C) (Oral)   Resp 17   Ht 5' 6\" (1.676 m)   Wt 241 lb (109.3 kg)   SpO2 95%   BMI 38.90 kg/m²     Allergies:  Patient has no known allergies. Medications:    Prior to Admission medications    Medication Sig Start Date End Date Taking? Authorizing Provider   acetaminophen-codeine (TYLENOL/CODEINE #3) 300-30 MG per tablet Take 1 tablet by mouth every 8 hours as needed for Pain.    Yes Historical Provider, MD   clopidogrel (PLAVIX) 75 MG tablet Take 75 mg by mouth daily   Yes Historical Provider, MD   carvedilol (COREG) 6.25 MG tablet Take 1 tablet by mouth daily 8/25/20  Yes Historical Provider, MD   pantoprazole (PROTONIX) 20 MG tablet Take 20 mg by mouth daily   Yes Historical Provider, MD   buPROPion (WELLBUTRIN SR) 150 MG extended release tablet Take 150 mg by mouth 2 times daily   Yes Historical Provider, MD   acetaminophen (TYLENOL) 500 MG tablet Take 1,000 mg by mouth 3 times daily as needed for Pain   Yes Historical Provider, MD   citalopram (CELEXA) 40 MG tablet TAKE 1 TABLET EVERY DAY 2/26/18  Yes Cristal Damon APRN - CNP   donepezil (ARICEPT) 10 MG tablet TAKE 1 TABLET EVERY DAY 12/26/17  Yes Bibiana Lau MD   atorvastatin (LIPITOR) 40 MG tablet TAKE 1 TABLET EVERY DAY 12/26/17  Yes Ed Weldon Aura Cat MD   losartan (COZAAR) 100 MG tablet TAKE 1 TABLET EVERY DAY 12/26/17  Yes Ed Guerrero MD   SYMBICORT 160-4.5 MCG/ACT AERO Inhale 2 puffs into the lungs daily 3/15/16  Yes Historical Provider, MD   ticagrelor (BRILINTA) 90 MG TABS tablet Take 1 tablet by mouth 2 times daily 5/5/20   Neetu Bergman MD   etodolac (LODINE) 500 MG tablet TAKE ONE TABLET BY MOUTH TWICE DAILY  5/16/19   SARAH Anderson CNP   Multiple Vitamins-Minerals (THERAPEUTIC MULTIVITAMIN-MINERALS) tablet Take 1 tablet by mouth daily    Historical Provider, MD   etodolac (LODINE) 500 MG tablet Take 500 mg by mouth 2 times daily    Historical Provider, MD   warfarin (COUMADIN) 5 MG tablet Take 5 mg daily or as directed by [facility] Fox 12/14/18   Emma Bond DO   atenolol (TENORMIN) 25 MG tablet TAKE 1 TABLET TWICE DAILY 9/14/18   SARAH Anderson CNP   fluticasone (FLONASE) 50 MCG/ACT nasal spray USE 2 SPRAYS IN EACH NOSTRIL EVERY DAY 6/28/16   SARAH Anderson CNP         This is a 69 y.o. male who is pleasant, cooperative, alert and oriented x3, in no acute distress. Heart: Heart sounds are normal.  HR regular rate and rhythm without murmur, gallop or rub. Lungs: Normal respiratory effort with equal expansion, good air exchange, unlabored and clear to auscultation without wheezes or rales bilaterally   Abdomen: soft, nontender, nondistended with bowel sounds   PEDAL PULSES + 2 BILATERALLY NO CALF TENDERNESS NO EDEMA. Labs:  Recent Labs     10/02/20  1235 09/16/20  1315   HGB  --  13.7   HCT  --  41.5   WBC  --  5.4   MCV  --  89.1    187   NA  --  139   K  --  4.5   CL  --  106   CO2  --  22   BUN  --  17   CREATININE  --  1.09   INR  --  2.1   PROTIME  --  21.0*   APTT 32.8  --    AST  --  23   ALT  --  20   LABALBU  --  4.1       No results for input(s): COVID19 in the last 720 hours.     [address]  Electronically signed 10/2/2020 at 1:02 or cough. Gastrointestinal: Negative for abdominal pain, N/V/D. Genitourinary: Negative for dysuria, frequency, urgency, or hematuria. Neurological: Negative for headache, numbness, or weakness. Integumentary: Negative for rash, itching, laceration, or abrasion. Musculoskeletal: Positive for Pain (Rt shoulder replacement 7 yrs)        Physical Exam:  Constitutional: Patient is oriented to person, place, and time. Patient appears well-developed and well nourished. HENT: Negative otherwise noted  Head: Normocephalic and Atraumatic  Nose: Normal  Eyes: Conjunctivae and EOM are normal  Neck: Normal range of motion Neck supple. Respiratory/Cardio: Effort normal. No respiratory distress. Musculoskeletal: Examination limited to the patient's right shoulder. Patient has pretty much pain-free range of motion of her shoulder both passively. He has good active strength in resisted abduction and external rotation. He does have a lot of tenderness in the right trapezial area and along and tenderness along the cervical spine. No obvious crepitus or grinding about the patient's shoulder that is painful. Neurological: Patient is alert and oriented to person, place, and time. Normal strenght. No sensory deficit. Skin: Skin is warm and dry  Psychiatric: Behavior is normal. Thought content normal.  Nursing note and vitals reviewed. Labs and Imaging:      XR taken today:  Xr Shoulder Right (min 2 Views)     Result Date: 9/25/2020  X-rays taken today reviewed by me show AP and outlet views of the patient's right shoulder. Patient is status post right total shoulder arthroplasty. Components appear to be in good position on both views.   No evidence for lucency and/or loosening of either the humeral or glenoid component                  Orders Placed This Encounter   Procedures    XR SHOULDER RIGHT (MIN 2 VIEWS)       Standing Status:   Future       Number of Occurrences:   1       Standing Expiration Date: 9/25/2021       Scheduling Instructions:         AP & outlet    MRI LUMBAR SPINE WO CONTRAST       Standing Status:   Future       Standing Expiration Date:   9/25/2021         Assessment and Plan:  1. Right shoulder pain, unspecified chronicity    2. History of right shoulder replacement    3. Spinal stenosis of lumbar region, unspecified whether neurogenic claudication present             This is a 69 y.o. male who presents to the clinic today for evaluation / follow up of status post right total shoulder.       Past History:  Current Medication     Current Outpatient Medications:     ticagrelor (BRILINTA) 90 MG TABS tablet, Take 1 tablet by mouth 2 times daily, Disp: 60 tablet, Rfl: 3    etodolac (LODINE) 500 MG tablet, TAKE ONE TABLET BY MOUTH TWICE DAILY , Disp: 180 tablet, Rfl: 0    pantoprazole (PROTONIX) 20 MG tablet, Take 20 mg by mouth daily, Disp: , Rfl:     buPROPion (WELLBUTRIN SR) 150 MG extended release tablet, Take 150 mg by mouth 2 times daily, Disp: , Rfl:     Multiple Vitamins-Minerals (THERAPEUTIC MULTIVITAMIN-MINERALS) tablet, Take 1 tablet by mouth daily, Disp: , Rfl:     acetaminophen (TYLENOL) 500 MG tablet, Take 1,000 mg by mouth 3 times daily as needed for Pain, Disp: , Rfl:     etodolac (LODINE) 500 MG tablet, Take 500 mg by mouth 2 times daily, Disp: , Rfl:     warfarin (COUMADIN) 5 MG tablet, Take 5 mg daily or as directed by [facility], Disp: 200 tablet, Rfl: 2    atenolol (TENORMIN) 25 MG tablet, TAKE 1 TABLET TWICE DAILY, Disp: 180 tablet, Rfl: 3    citalopram (CELEXA) 40 MG tablet, TAKE 1 TABLET EVERY DAY, Disp: 90 tablet, Rfl: 3    donepezil (ARICEPT) 10 MG tablet, TAKE 1 TABLET EVERY DAY, Disp: 90 tablet, Rfl: 3    atorvastatin (LIPITOR) 40 MG tablet, TAKE 1 TABLET EVERY DAY, Disp: 90 tablet, Rfl: 3    losartan (COZAAR) 100 MG tablet, TAKE 1 TABLET EVERY DAY, Disp: 90 tablet, Rfl: 3    fluticasone (FLONASE) 50 MCG/ACT nasal spray, USE 2 SPRAYS IN [facility] NOSTRIL EVERY DAY, Disp: 3 Bottle, Rfl: 3    SYMBICORT 160-4.5 MCG/ACT AERO, Inhale 2 puffs into the lungs daily, Disp: , Rfl:     B Complex Vitamins (VITAMIN B COMPLEX PO), Take 1 tablet by mouth daily, Disp: , Rfl:      No Known Allergies  Social History               Socioeconomic History    Marital status:        Spouse name: Not on file    Number of children: Not on file    Years of education: Not on file    Highest education level: Not on file   Occupational History    Occupation: packaging       Employer: [employer] Financial resource strain: Not on file    Food insecurity       Worry: Not on file       Inability: Not on file    Transportation needs       Medical: Not on file       Non-medical: Not on file   Tobacco Use    Smoking status: Former Smoker    Smokeless tobacco: Never Used   Substance and Sexual Activity    Alcohol use: No       Alcohol/week: 0.0 standard drinks       Comment: very rare    Drug use: No    Sexual activity: Not on file   Lifestyle    Physical activity       Days per week: Not on file       Minutes per session: Not on file    Stress: Not on file   Relationships    Social connections       Talks on phone: Not on file       Gets together: Not on file       Attends Advent service: Not on file       Active member of club or organization: Not on file       Attends meetings of clubs or organizations: Not on file       Relationship status: Not on file    Intimate partner violence       Fear of current or ex partner: Not on file       Emotionally abused: Not on file       Physically abused: Not on file       Forced sexual activity: Not on file   Other Topics Concern    Not on file   Social History Narrative    Not on file        Past Medical History        Past Medical History:   Diagnosis Date    Acute pansinusitis      Allergic rhinitis      Asthma      Asthma      [facility]      transcription may have occurred.

## 2020-10-02 NOTE — PROGRESS NOTES
Pt tolerated procedure without distress. Dressing applied to procedure site.  Pt taken to ct for additional scans will go to pacu after ct scan

## 2020-10-06 ENCOUNTER — OFFICE VISIT (OUTPATIENT)
Dept: ORTHOPEDIC SURGERY | Age: 70
End: 2020-10-06
Payer: MEDICARE

## 2020-10-06 PROBLEM — M54.50 CHRONIC LOW BACK PAIN: Status: ACTIVE | Noted: 2020-10-06

## 2020-10-06 PROBLEM — M48.061 LUMBAR FORAMINAL STENOSIS: Status: ACTIVE | Noted: 2020-10-06

## 2020-10-06 PROBLEM — G89.29 CHRONIC LOW BACK PAIN: Status: ACTIVE | Noted: 2020-10-06

## 2020-10-06 PROBLEM — M51.369 LUMBAR DEGENERATIVE DISC DISEASE: Status: ACTIVE | Noted: 2020-10-06

## 2020-10-06 PROBLEM — M51.36 LUMBAR DEGENERATIVE DISC DISEASE: Status: ACTIVE | Noted: 2020-10-06

## 2020-10-06 PROCEDURE — 1123F ACP DISCUSS/DSCN MKR DOCD: CPT | Performed by: ORTHOPAEDIC SURGERY

## 2020-10-06 PROCEDURE — G8427 DOCREV CUR MEDS BY ELIG CLIN: HCPCS | Performed by: ORTHOPAEDIC SURGERY

## 2020-10-06 PROCEDURE — 4040F PNEUMOC VAC/ADMIN/RCVD: CPT | Performed by: ORTHOPAEDIC SURGERY

## 2020-10-06 PROCEDURE — 1036F TOBACCO NON-USER: CPT | Performed by: ORTHOPAEDIC SURGERY

## 2020-10-06 PROCEDURE — G8484 FLU IMMUNIZE NO ADMIN: HCPCS | Performed by: ORTHOPAEDIC SURGERY

## 2020-10-06 PROCEDURE — 3017F COLORECTAL CA SCREEN DOC REV: CPT | Performed by: ORTHOPAEDIC SURGERY

## 2020-10-06 PROCEDURE — 99213 OFFICE O/P EST LOW 20 MIN: CPT | Performed by: ORTHOPAEDIC SURGERY

## 2020-10-06 PROCEDURE — G8417 CALC BMI ABV UP PARAM F/U: HCPCS | Performed by: ORTHOPAEDIC SURGERY

## 2020-10-06 NOTE — PROGRESS NOTES
Appearance: He is well-developed. HENT:      Head: Normocephalic and atraumatic. Nose: Nose normal.   Eyes:      Conjunctiva/sclera: Conjunctivae normal.   Neck:      Musculoskeletal: Normal range of motion and neck supple. Pulmonary:      Effort: Pulmonary effort is normal. No respiratory distress. Musculoskeletal:      Comments: Normal gait     Skin:     General: Skin is warm and dry. Neurological:      Mental Status: He is alert and oriented to person, place, and time. Sensory: No sensory deficit. Psychiatric:         Behavior: Behavior normal.         Thought Content: Thought content normal.     AP lateral with lateral flexion and extension lumbar films history of spinal cord stimulator and L4 laminectomy ankylosis at L4-5 no abnormal motion on flexion-extension -significant flatback deformity predominantly L2-L5    CT myelogram lumbar spine is reviewed L4-5 ankylosis anteriorly severe flat back deformity fairly significant multilevel foraminal stenosis with some moderate stenosis at 2 3 and 3 4 severe foraminal stenosis 4 5 despite prior decompression    Assessment:          1. Spinal stenosis of lumbar region, unspecified whether neurogenic claudication present    2. Chronic low back pain, unspecified back pain laterality, unspecified whether sciatica present    3. Lumbar degenerative disc disease    4. Lumbar foraminal stenosis        Plan:     Patient likely a candidate for L2-4 PLIF with L4-5 benoit decompression and a 45 posterior instrumentation    No orders of the defined types were placed in this encounter. Calvin Tran MD    Please note that this chart was generated using voicerecognition Dragon dictation software. Although every effort was made to ensurethe accuracy of this automated transcription, some errors in transcription may haveoccurred.

## 2020-10-16 ENCOUNTER — HOSPITAL ENCOUNTER (OUTPATIENT)
Age: 70
Setting detail: SPECIMEN
Discharge: HOME | End: 2020-10-16
Payer: MEDICARE

## 2020-10-16 LAB
INR BLD: 1.4
PROTHROMBIN TIME: 14.5 SEC (ref 9–12)

## 2020-11-05 ENCOUNTER — HOSPITAL ENCOUNTER (OUTPATIENT)
Age: 70
Setting detail: SPECIMEN
Discharge: HOME | End: 2020-11-05
Payer: MEDICARE

## 2020-11-05 LAB
INR BLD: 2.3
PROTHROMBIN TIME: 23.5 SEC (ref 9–12)

## 2020-11-19 ENCOUNTER — HOSPITAL ENCOUNTER (OUTPATIENT)
Age: 70
Setting detail: SPECIMEN
Discharge: HOME | End: 2020-11-19
Payer: MEDICARE

## 2020-11-19 LAB
ANION GAP SERPL CALCULATED.3IONS-SCNC: 15 MMOL/L (ref 9–17)
BUN BLDV-MCNC: 19 MG/DL (ref 8–23)
BUN/CREAT BLD: ABNORMAL (ref 9–20)
CALCIUM SERPL-MCNC: 9.3 MG/DL (ref 8.6–10.4)
CHLORIDE BLD-SCNC: 105 MMOL/L (ref 98–107)
CHOLESTEROL/HDL RATIO: 3.3
CHOLESTEROL: 154 MG/DL
CO2: 20 MMOL/L (ref 20–31)
CREAT SERPL-MCNC: 1.07 MG/DL (ref 0.7–1.2)
GFR AFRICAN AMERICAN: >60 ML/MIN
GFR NON-AFRICAN AMERICAN: >60 ML/MIN
GFR SERPL CREATININE-BSD FRML MDRD: ABNORMAL ML/MIN/{1.73_M2}
GFR SERPL CREATININE-BSD FRML MDRD: ABNORMAL ML/MIN/{1.73_M2}
GLUCOSE BLD-MCNC: 100 MG/DL (ref 70–99)
HCT VFR BLD CALC: 42.9 % (ref 40.7–50.3)
HDLC SERPL-MCNC: 46 MG/DL
HEMOGLOBIN: 13.8 G/DL (ref 13–17)
HEPATITIS C ANTIBODY: NONREACTIVE
INR BLD: 3.1
LDL CHOLESTEROL: 72 MG/DL (ref 0–130)
MCH RBC QN AUTO: 28.6 PG (ref 25.2–33.5)
MCHC RBC AUTO-ENTMCNC: 32.2 G/DL (ref 28.4–34.8)
MCV RBC AUTO: 89 FL (ref 82.6–102.9)
NRBC AUTOMATED: 0 PER 100 WBC
PDW BLD-RTO: 13.9 % (ref 11.8–14.4)
PLATELET # BLD: 210 K/UL (ref 138–453)
PMV BLD AUTO: 11 FL (ref 8.1–13.5)
POTASSIUM SERPL-SCNC: 4.2 MMOL/L (ref 3.7–5.3)
PROTHROMBIN TIME: 30.8 SEC (ref 9–12)
RBC # BLD: 4.82 M/UL (ref 4.21–5.77)
SARS-COV-2 ANTIBODY, TOTAL: NEGATIVE
SODIUM BLD-SCNC: 140 MMOL/L (ref 135–144)
THYROXINE, FREE: 1.16 NG/DL (ref 0.93–1.7)
TRIGL SERPL-MCNC: 180 MG/DL
TSH SERPL DL<=0.05 MIU/L-ACNC: 1.94 MIU/L (ref 0.3–5)
VITAMIN B-12: 435 PG/ML (ref 232–1245)
VITAMIN D 25-HYDROXY: 19.8 NG/ML (ref 30–100)
VLDLC SERPL CALC-MCNC: ABNORMAL MG/DL (ref 1–30)
WBC # BLD: 5.8 K/UL (ref 3.5–11.3)

## 2020-12-01 ENCOUNTER — TELEPHONE (OUTPATIENT)
Dept: GASTROENTEROLOGY | Age: 70
End: 2020-12-01

## 2020-12-01 NOTE — TELEPHONE ENCOUNTER
Rani Shah [facility] stating he would like to schedule a colonoscopy with Dr Ruddy Borrego.

## 2020-12-03 NOTE — TELEPHONE ENCOUNTER
[facility] called office to schedule colonoscopy per referral from PCP. Writer advised patient that his referral is not in our system and advised him to fax his referral or have PCP fax to us. Fax number provided to patient. [facility] also asked on behalf of his wife Marquez Vernon if 2021 schedules are ready yet. Writer advised him that we do not have the 2021 templates opened for Dr. Ayaan Crystal as of yet.

## 2020-12-07 NOTE — TELEPHONE ENCOUNTER
Writer spoke to [facility] to discuss scheduling screening colonoscopy from referral, screening questionnaire reviewed - pt scheduled for New Patient appt 1/11/2021 @ 3:15 at [facility] location

## 2020-12-09 ENCOUNTER — HOSPITAL ENCOUNTER (OUTPATIENT)
Age: 70
Setting detail: SPECIMEN
Discharge: HOME | End: 2020-12-09
Payer: MEDICARE

## 2020-12-09 LAB
ABSOLUTE EOS #: 0.33 K/UL (ref 0–0.44)
ABSOLUTE IMMATURE GRANULOCYTE: <0.03 K/UL (ref 0–0.3)
ABSOLUTE LYMPH #: 1.59 K/UL (ref 1.1–3.7)
ABSOLUTE MONO #: 0.65 K/UL (ref 0.1–1.2)
ALBUMIN SERPL-MCNC: 4.2 G/DL (ref 3.5–5.2)
ALBUMIN/GLOBULIN RATIO: 1.7 (ref 1–2.5)
ALP BLD-CCNC: 92 U/L (ref 40–129)
ALT SERPL-CCNC: 21 U/L (ref 5–41)
ANION GAP SERPL CALCULATED.3IONS-SCNC: 19 MMOL/L (ref 9–17)
AST SERPL-CCNC: 22 U/L
BASOPHILS # BLD: 1 % (ref 0–2)
BASOPHILS ABSOLUTE: 0.07 K/UL (ref 0–0.2)
BILIRUB SERPL-MCNC: 0.53 MG/DL (ref 0.3–1.2)
BUN BLDV-MCNC: 16 MG/DL (ref 8–23)
BUN/CREAT BLD: ABNORMAL (ref 9–20)
CALCIUM SERPL-MCNC: 9.3 MG/DL (ref 8.6–10.4)
CHLORIDE BLD-SCNC: 103 MMOL/L (ref 98–107)
CO2: 20 MMOL/L (ref 20–31)
CREAT SERPL-MCNC: 1.07 MG/DL (ref 0.7–1.2)
DIFFERENTIAL TYPE: ABNORMAL
EOSINOPHILS RELATIVE PERCENT: 6 % (ref 1–4)
GFR AFRICAN AMERICAN: >60 ML/MIN
GFR NON-AFRICAN AMERICAN: >60 ML/MIN
GFR SERPL CREATININE-BSD FRML MDRD: ABNORMAL ML/MIN/{1.73_M2}
GFR SERPL CREATININE-BSD FRML MDRD: ABNORMAL ML/MIN/{1.73_M2}
GLUCOSE BLD-MCNC: 132 MG/DL (ref 70–99)
HCT VFR BLD CALC: 42.9 % (ref 40.7–50.3)
HEMOGLOBIN: 14.1 G/DL (ref 13–17)
IMMATURE GRANULOCYTES: 0 %
INR BLD: 2
LYMPHOCYTES # BLD: 30 % (ref 24–43)
MCH RBC QN AUTO: 29 PG (ref 25.2–33.5)
MCHC RBC AUTO-ENTMCNC: 32.9 G/DL (ref 28.4–34.8)
MCV RBC AUTO: 88.1 FL (ref 82.6–102.9)
MONOCYTES # BLD: 12 % (ref 3–12)
NRBC AUTOMATED: 0 PER 100 WBC
PDW BLD-RTO: 14.2 % (ref 11.8–14.4)
PLATELET # BLD: 185 K/UL (ref 138–453)
PLATELET ESTIMATE: ABNORMAL
PMV BLD AUTO: 11.1 FL (ref 8.1–13.5)
POTASSIUM SERPL-SCNC: 4.1 MMOL/L (ref 3.7–5.3)
PROTHROMBIN TIME: 20 SEC (ref 9–12)
RBC # BLD: 4.87 M/UL (ref 4.21–5.77)
RBC # BLD: ABNORMAL 10*6/UL
SEG NEUTROPHILS: 51 % (ref 36–65)
SEGMENTED NEUTROPHILS ABSOLUTE COUNT: 2.59 K/UL (ref 1.5–8.1)
SODIUM BLD-SCNC: 142 MMOL/L (ref 135–144)
TOTAL PROTEIN: 6.7 G/DL (ref 6.4–8.3)
WBC # BLD: 5.3 K/UL (ref 3.5–11.3)
WBC # BLD: ABNORMAL 10*3/UL

## 2020-12-11 ENCOUNTER — HOSPITAL ENCOUNTER (OUTPATIENT)
Dept: VASCULAR LAB | Age: 70
Discharge: HOME | End: 2020-12-11
Payer: MEDICARE

## 2020-12-11 PROCEDURE — 76706 US ABDL AORTA SCREEN AAA: CPT

## 2021-01-11 ENCOUNTER — OFFICE VISIT (OUTPATIENT)
Dept: GASTROENTEROLOGY | Age: 71
End: 2021-01-11

## 2021-01-11 ENCOUNTER — TELEPHONE (OUTPATIENT)
Dept: GASTROENTEROLOGY | Age: 71
End: 2021-01-11

## 2021-01-11 VITALS — BODY MASS INDEX: 39.38 KG/M2 | WEIGHT: 244 LBS | TEMPERATURE: 97.6 F

## 2021-01-11 DIAGNOSIS — Z12.11 SCREEN FOR COLON CANCER: Primary | ICD-10-CM

## 2021-01-11 PROCEDURE — 99999 PR OFFICE/OUTPT VISIT,PROCEDURE ONLY: CPT | Performed by: INTERNAL MEDICINE

## 2021-01-11 ASSESSMENT — ENCOUNTER SYMPTOMS
RESPIRATORY NEGATIVE: 1
BACK PAIN: 0
BLOOD IN STOOL: 0
ANAL BLEEDING: 0
NAUSEA: 0
GASTROINTESTINAL NEGATIVE: 1
RECTAL PAIN: 0
VOMITING: 0
WHEEZING: 0
SINUS PRESSURE: 0
VOICE CHANGE: 0
CHOKING: 0
ALLERGIC/IMMUNOLOGIC NEGATIVE: 1
TROUBLE SWALLOWING: 0
COUGH: 0
ABDOMINAL DISTENTION: 0
SORE THROAT: 0
ABDOMINAL PAIN: 0
CONSTIPATION: 0
DIARRHEA: 0

## 2021-01-11 NOTE — TELEPHONE ENCOUNTER
Patient was seen today with Dr. Rodriguez who ordered colonoscopy. Radha Bajwa takes both Plavix which is managed by Dr. Zohaib Cristina at [facility] and Coumadin which is managed by Dr. Huber Soni with [facility]. Patient aware that clearances for both medications are needed. Patient tentatively scheduled for colonoscopy with Dr. Rodriguez at [facility] on Thursday 1/28/21, tentative due to it being after hours at the time of discussion. Patient aware of the need for Covid-19 testing prior to procedure and would like a call after 12PM to finalize details on 1/12/21. Bowel preparation instructions can be emailed to patient and send via [address] Clearance requests scanned to media and sent via Epic.

## 2021-01-11 NOTE — PROGRESS NOTES
Reason for Referral:   Monique Vaughn MD  [address],  [address]    Chief Complaint   Patient presents with    Colonoscopy     Patient is taking Plavix and Coumadin and is time for a Colonoscopy. HISTORY OF PRESENT ILLNESS: Mr.Gerald DAWOOD Rascon is a 69 y.o. male , referred for evaluation of screening for colon cancer. He denies any symptoms. He is on Plavix and Coumadin by cardiology. He denies any blood in the stool. No change in bowel habits. No weight loss. No prior colonoscopy. No known family history of GI malignancy. Past Medical,Family, and Social History reviewed and does not contribute to the patient presentingcondition. Patient's PMH/PSH,SH,PSYCH Hx, MEDs, ALLERGIES, and ROS were all reviewed and updated in the appropriate sections. PAST MEDICAL HISTORY:  Past Medical History:   Diagnosis Date    Acute pansinusitis     Allergic rhinitis     Asthma     Asthma     Dementia without behavioral disturbance ([address]     Gastroesophageal reflux     History of DVT (deep vein thrombosis) 8/27/2015    Hx of blood clots     Hyperlipidemia     Hyperlipidemia LDL goal < 100     Hypertension     Hypertension, benign     Major depression, chronic 8/27/2015    Osteoarthritis     Pulmonary emboli ([address] 2007 approx.     bilateral    Severe obesity (BMI 35.0-35.9 with comorbidity) ([facility] [address] 8/27/2015    Sleep apnea        Past Surgical History:   Procedure Laterality Date    ANKLE FUSION      CARDIAC CATHETERIZATION  05/05/2020    CARPAL TUNNEL RELEASE      JOINT REPLACEMENT      SHOULDER ARTHROPLASTY      SPINAL CORD STIMULATOR SURGERY      TONSILLECTOMY         CURRENT MEDICATIONS:    Current Outpatient Medications:     acetaminophen-codeine (TYLENOL/CODEINE #3) 300-30 MG per tablet, Take 1 tablet by mouth every 8 hours as needed for Pain., Disp: , Rfl:     clopidogrel (PLAVIX) 75 MG tablet, Take 75 mg by mouth daily, Disp: , Rfl:   carvedilol (COREG) 6.25 MG tablet, Take 1 tablet by mouth daily, Disp: , Rfl:     ticagrelor (BRILINTA) 90 MG TABS tablet, Take 1 tablet by mouth 2 times daily, Disp: 60 tablet, Rfl: 3    etodolac (LODINE) 500 MG tablet, TAKE ONE TABLET BY MOUTH TWICE DAILY , Disp: 180 tablet, Rfl: 0    pantoprazole (PROTONIX) 20 MG tablet, Take 20 mg by mouth daily, Disp: , Rfl:     buPROPion (WELLBUTRIN SR) 150 MG extended release tablet, Take 150 mg by mouth 2 times daily, Disp: , Rfl:     Multiple Vitamins-Minerals (THERAPEUTIC MULTIVITAMIN-MINERALS) tablet, Take 1 tablet by mouth daily, Disp: , Rfl:     acetaminophen (TYLENOL) 500 MG tablet, Take 1,000 mg by mouth 3 times daily as needed for Pain, Disp: , Rfl:     etodolac (LODINE) 500 MG tablet, Take 500 mg by mouth 2 times daily, Disp: , Rfl:     warfarin (COUMADIN) 5 MG tablet, Take 5 mg daily or as directed by [facility], Disp: 200 tablet, Rfl: 2    atenolol (TENORMIN) 25 MG tablet, TAKE 1 TABLET TWICE DAILY, Disp: 180 tablet, Rfl: 3    citalopram (CELEXA) 40 MG tablet, TAKE 1 TABLET EVERY DAY, Disp: 90 tablet, Rfl: 3    donepezil (ARICEPT) 10 MG tablet, TAKE 1 TABLET EVERY DAY, Disp: 90 tablet, Rfl: 3    atorvastatin (LIPITOR) 40 MG tablet, TAKE 1 TABLET EVERY DAY, Disp: 90 tablet, Rfl: 3    losartan (COZAAR) 100 MG tablet, TAKE 1 TABLET EVERY DAY, Disp: 90 tablet, Rfl: 3    fluticasone (FLONASE) 50 MCG/ACT nasal spray, USE 2 SPRAYS IN EACH NOSTRIL EVERY DAY, Disp: 3 Bottle, Rfl: 3    SYMBICORT 160-4.5 MCG/ACT AERO, Inhale 2 puffs into the lungs daily, Disp: , Rfl:     ALLERGIES:   No Known Allergies    FAMILY HISTORY:       Problem Relation Age of Onset    Heart Disease Father     High Blood Pressure Father          SOCIAL HISTORY:   Social History     Socioeconomic History    Marital status:      Spouse name: Not on file    Number of children: Not on file    Years of education: Not on file  Highest education level: Not on file   Occupational History    Occupation: packaging     Employer: [employer] Financial resource strain: Not on file    Food insecurity     Worry: Not on file     Inability: Not on file   [employer] needs     Medical: Not on file     Non-medical: Not on file   Tobacco Use    Smoking status: Former Smoker    Smokeless tobacco: Never Used   Substance and Sexual Activity    Alcohol use: No     Alcohol/week: 0.0 standard drinks     Comment: very rare    Drug use: No    Sexual activity: Not on file   Lifestyle    Physical activity     Days per week: Not on file     Minutes per session: Not on file    Stress: Not on file   Relationships    Social connections     Talks on phone: Not on file     Gets together: Not on file     Attends Religion service: Not on file     Active member of club or organization: Not on file     Attends meetings of clubs or organizations: Not on file     Relationship status: Not on file    Intimate partner violence     Fear of current or ex partner: Not on file     Emotionally abused: Not on file     Physically abused: Not on file     Forced sexual activity: Not on file   Other Topics Concern    Not on file   Social History Narrative    Not on file       REVIEW OF SYSTEMS: A 12-point review of systemswas obtained and pertinent positives and negatives were enumerated above in the history of present illness. All other reviewed systems / symptoms were negative. Review of Systems   Constitutional: Negative. Negative for appetite change, fatigue and unexpected weight change. HENT: Negative. Negative for dental problem, postnasal drip, sinus pressure, sore throat, trouble swallowing and voice change. Eyes: Positive for visual disturbance. Respiratory: Negative. Negative for cough, choking and wheezing. Cardiovascular: Negative. Negative for chest pain, palpitations and leg swelling. Gastrointestinal: Negative. Negative for abdominal distention, abdominal pain, anal bleeding, blood in stool, constipation, diarrhea, nausea, rectal pain and vomiting. Endocrine: Negative. Genitourinary: Negative. Negative for difficulty urinating. Musculoskeletal: Positive for arthralgias. Negative for back pain, gait problem and myalgias. Allergic/Immunologic: Negative. Negative for environmental allergies and food allergies. Neurological: Negative. Negative for dizziness, weakness, light-headedness, numbness and headaches. Hematological: Negative. Does not bruise/bleed easily. Psychiatric/Behavioral: Negative. Negative for sleep disturbance. The patient is not nervous/anxious. LABORATORY DATA: Reviewed  Lab Results   Component Value Date    WBC 5.3 12/09/2020    HGB 14.1 12/09/2020    HCT 42.9 12/09/2020    MCV 88.1 12/09/2020     12/09/2020     12/09/2020    K 4.1 12/09/2020     12/09/2020    CO2 20 12/09/2020    BUN 16 12/09/2020    CREATININE 1.07 12/09/2020    LABALBU 4.2 12/09/2020    BILITOT 0.53 12/09/2020    ALKPHOS 92 12/09/2020    AST 22 12/09/2020    ALT 21 12/09/2020    INR 2.0 12/09/2020         Lab Results   Component Value Date    RBC 4.87 12/09/2020    HGB 14.1 12/09/2020    MCV 88.1 12/09/2020    MCH 29.0 12/09/2020    MCHC 32.9 12/09/2020    RDW 14.2 12/09/2020    MPV 11.1 12/09/2020    BASOPCT 1 12/09/2020    LYMPHSABS 1.59 12/09/2020    MONOSABS 0.65 12/09/2020    NEUTROABS 2.59 12/09/2020    EOSABS 0.33 12/09/2020    BASOSABS 0.07 12/09/2020         DIAGNOSTIC TESTING:     No results found. There were no vitals taken for this visit. PHYSICAL EXAMINATION: Vital signs reviewed per the nursing documentation. There is no height or weight on file to calculate BMI. Physical Exam      I personally reviewed the nurse's notes and documentation and I agree with her notes. General: alert, appears stated age and cooperative Psych: Normal. and Alert and oriented, appropriate affect. . Normal affect. Mentation normal  HEENT: PERRLA. Clear conjunctivae and sclerae. Moist oral mucosae, no lesions or ulcers. The neck is supple, without lymphadenopathy or jugular venous distension. No masses. Normal thyroid. Cardiovascular: S1 S2 RRR no rubs or murmurs. Pulmonary: clear BL. No accessory muscle usage. Abdominal Exam: Soft, NT ND, no hepato or spleno megaly, +BS, no ascites. Obese  No groin masses or lymphadenopathy. Extremities: no lower ext edema. Skin: Warm skin. No skin rash. No spider nevi palmar erythema nail dystrophy. Joint: No joint swelling or deformity. Neurological: intact sensory. DTR+. IMPRESSION: Mr. Estrellita Mills is a 69 y.o. male with no GI issues. He presents for evaluation prior to screening colonoscopy. He is on Coumadin and Plavix. We will obtain clearance. He needs MAC for sedation given comorbidities. Spent 30 minutes providing patient education and counseling. Thank you for allowing me to participate in the care of Mr. Estrellita Mills. For any further questions please do not hesitate to contact me. I have reviewed and agree with the MA/LPN ROS. Note is dictated utilizing voice recognition software. Unfortunately this leads to occasional typographical errors. Please contact our office if you have any questions.     Fahad Solano MD  [facility]  O: #[phone]

## 2021-01-12 RX ORDER — POLYETHYLENE GLYCOL 3350 17 G/17G
POWDER, FOR SOLUTION ORAL
Qty: 238 G | Refills: 0 | Status: SHIPPED | OUTPATIENT
Start: 2021-01-12

## 2021-01-13 NOTE — TELEPHONE ENCOUNTER
Received a voicemail from IVANNA Hooys office for Andriy Edwards to call her back. It was regarding the clearance. The message was garbled. Tried calling the office back to get a better verification but was put on hold too long.   Andriy Edwards please call her back at [phone] per request.

## 2021-01-13 NOTE — TELEPHONE ENCOUNTER
Typo in writer's initial message. Clearance was addressed to Dr. Ambreen Gutierrez (see media). Additionally, Dr. Keo Lyles and Dr. Mic Stvoall are both providers at [facility].

## 2021-01-13 NOTE — TELEPHONE ENCOUNTER
Spoke with Fernanda Chiang at Dr. Anamika Ordaz office, she wanted to update that the clearance request was forwarded to the Roberth Knutson office.     Phone:[phone]  Fax: [phone]

## 2021-01-14 ENCOUNTER — HOSPITAL ENCOUNTER (OUTPATIENT)
Age: 71
Setting detail: SPECIMEN
Discharge: HOME | End: 2021-01-14
Payer: MEDICARE

## 2021-01-14 LAB
INR BLD: 1.8
PROTHROMBIN TIME: 18.3 SEC (ref 9–12)

## 2021-01-14 NOTE — TELEPHONE ENCOUNTER
Everett Zeng from [facility] for Dr Rosalia Baumann sent fax stating:  Not here til Monday, will get signed then.

## 2021-01-15 NOTE — TELEPHONE ENCOUNTER
Cardiac clearance received and scanned from Dr Delma Song. Ok to hold Plavix for 5 days and resume after procedure. Coumadin is for indication of PE/DVT and PCP will need to give direction regarding holding or bridging.

## 2021-01-18 NOTE — TELEPHONE ENCOUNTER
Talked to Víctor Fu to inform him Dr Carol Burgos approved hold of Plavix 5 days prior to procedure and Dr Halle Pressley approved hold of Coumadin 3-5 days prior to procedure. Víctor Fu states he will hold them both for 5 days.

## 2021-01-22 NOTE — TELEPHONE ENCOUNTER
Chava May called the office to verify the generic Polyethylene Glycol was ok to use and to advise that pharmacy gave him a 510 gram bottle instead of 238 gram - verified with clinical staff measuring  cap is 17 grams so 14 capfuls =  238 grams.

## 2021-01-24 ENCOUNTER — HOSPITAL ENCOUNTER (OUTPATIENT)
Dept: LAB | Age: 71
Setting detail: SPECIMEN
Discharge: HOME | End: 2021-01-24
Payer: MEDICARE

## 2021-01-24 DIAGNOSIS — Z01.818 PREOP TESTING: Primary | ICD-10-CM

## 2021-01-24 PROCEDURE — U0005 INFEC AGEN DETEC AMPLI PROBE: HCPCS

## 2021-01-24 PROCEDURE — U0003 INFECTIOUS AGENT DETECTION BY NUCLEIC ACID (DNA OR RNA); SEVERE ACUTE RESPIRATORY SYNDROME CORONAVIRUS 2 (SARS-COV-2) (CORONAVIRUS DISEASE [COVID-19]), AMPLIFIED PROBE TECHNIQUE, MAKING USE OF HIGH THROUGHPUT TECHNOLOGIES AS DESCRIBED BY CMS-2020-01-R: HCPCS

## 2021-01-25 LAB
SARS-COV-2, RAPID: NORMAL
SARS-COV-2: NORMAL
SARS-COV-2: NOT DETECTED
SOURCE: NORMAL

## 2021-01-28 ENCOUNTER — ANESTHESIA (OUTPATIENT)
Dept: OPERATING ROOM | Age: 71
End: 2021-01-28
Payer: MEDICARE

## 2021-01-28 ENCOUNTER — ANESTHESIA EVENT (OUTPATIENT)
Dept: OPERATING ROOM | Age: 71
End: 2021-01-28
Payer: MEDICARE

## 2021-01-28 ENCOUNTER — HOSPITAL ENCOUNTER (OUTPATIENT)
Age: 71
Setting detail: OUTPATIENT SURGERY
Discharge: HOME | End: 2021-01-28
Attending: INTERNAL MEDICINE | Admitting: INTERNAL MEDICINE
Payer: MEDICARE

## 2021-01-28 VITALS
TEMPERATURE: 97.7 F | BODY MASS INDEX: 38.57 KG/M2 | WEIGHT: 240 LBS | HEART RATE: 56 BPM | OXYGEN SATURATION: 93 % | DIASTOLIC BLOOD PRESSURE: 68 MMHG | SYSTOLIC BLOOD PRESSURE: 108 MMHG | HEIGHT: 66 IN | RESPIRATION RATE: 20 BRPM

## 2021-01-28 VITALS — SYSTOLIC BLOOD PRESSURE: 102 MMHG | OXYGEN SATURATION: 96 % | DIASTOLIC BLOOD PRESSURE: 60 MMHG

## 2021-01-28 LAB
INR BLD: 1.2
PROTHROMBIN TIME: 14.8 SEC (ref 11.5–14.2)

## 2021-01-28 PROCEDURE — 3700000000 HC ANESTHESIA ATTENDED CARE: Performed by: INTERNAL MEDICINE

## 2021-01-28 PROCEDURE — 85610 PROTHROMBIN TIME: CPT

## 2021-01-28 PROCEDURE — 3609010600 HC COLONOSCOPY POLYPECTOMY SNARE/COLD BIOPSY: Performed by: INTERNAL MEDICINE

## 2021-01-28 PROCEDURE — 2500000003 HC RX 250 WO HCPCS: Performed by: NURSE ANESTHETIST, CERTIFIED REGISTERED

## 2021-01-28 PROCEDURE — 2709999900 HC NON-CHARGEABLE SUPPLY: Performed by: INTERNAL MEDICINE

## 2021-01-28 PROCEDURE — 88305 TISSUE EXAM BY PATHOLOGIST: CPT

## 2021-01-28 PROCEDURE — 45380 COLONOSCOPY AND BIOPSY: CPT | Performed by: INTERNAL MEDICINE

## 2021-01-28 PROCEDURE — 7100000010 HC PHASE II RECOVERY - FIRST 15 MIN: Performed by: INTERNAL MEDICINE

## 2021-01-28 PROCEDURE — 3700000001 HC ADD 15 MINUTES (ANESTHESIA): Performed by: INTERNAL MEDICINE

## 2021-01-28 PROCEDURE — 2580000003 HC RX 258: Performed by: ANESTHESIOLOGY

## 2021-01-28 PROCEDURE — 6360000002 HC RX W HCPCS: Performed by: NURSE ANESTHETIST, CERTIFIED REGISTERED

## 2021-01-28 PROCEDURE — 43235 EGD DIAGNOSTIC BRUSH WASH: CPT | Performed by: INTERNAL MEDICINE

## 2021-01-28 PROCEDURE — 7100000011 HC PHASE II RECOVERY - ADDTL 15 MIN: Performed by: INTERNAL MEDICINE

## 2021-01-28 RX ORDER — HYDRALAZINE HYDROCHLORIDE 20 MG/ML
5 INJECTION INTRAMUSCULAR; INTRAVENOUS EVERY 10 MIN PRN
Status: DISCONTINUED | OUTPATIENT
Start: 2021-01-28 | End: 2021-01-28 | Stop reason: HOSPADM

## 2021-01-28 RX ORDER — HYDROMORPHONE HCL 110MG/55ML
0.5 PATIENT CONTROLLED ANALGESIA SYRINGE INTRAVENOUS EVERY 5 MIN PRN
Status: DISCONTINUED | OUTPATIENT
Start: 2021-01-28 | End: 2021-01-28 | Stop reason: HOSPADM

## 2021-01-28 RX ORDER — LIDOCAINE HYDROCHLORIDE 20 MG/ML
INJECTION, SOLUTION EPIDURAL; INFILTRATION; INTRACAUDAL; PERINEURAL PRN
Status: DISCONTINUED | OUTPATIENT
Start: 2021-01-28 | End: 2021-01-28 | Stop reason: SDUPTHER

## 2021-01-28 RX ORDER — SODIUM CHLORIDE, SODIUM LACTATE, POTASSIUM CHLORIDE, CALCIUM CHLORIDE 600; 310; 30; 20 MG/100ML; MG/100ML; MG/100ML; MG/100ML
INJECTION, SOLUTION INTRAVENOUS CONTINUOUS
Status: DISCONTINUED | OUTPATIENT
Start: 2021-01-28 | End: 2021-01-28 | Stop reason: HOSPADM

## 2021-01-28 RX ORDER — HYDROCODONE BITARTRATE AND ACETAMINOPHEN 5; 325 MG/1; MG/1
2 TABLET ORAL PRN
Status: DISCONTINUED | OUTPATIENT
Start: 2021-01-28 | End: 2021-01-28 | Stop reason: HOSPADM

## 2021-01-28 RX ORDER — HYDROCODONE BITARTRATE AND ACETAMINOPHEN 5; 325 MG/1; MG/1
1 TABLET ORAL PRN
Status: DISCONTINUED | OUTPATIENT
Start: 2021-01-28 | End: 2021-01-28 | Stop reason: HOSPADM

## 2021-01-28 RX ORDER — SODIUM CHLORIDE 9 MG/ML
INJECTION, SOLUTION INTRAVENOUS CONTINUOUS
Status: DISCONTINUED | OUTPATIENT
Start: 2021-01-28 | End: 2021-01-28

## 2021-01-28 RX ORDER — ONDANSETRON 2 MG/ML
4 INJECTION INTRAMUSCULAR; INTRAVENOUS
Status: DISCONTINUED | OUTPATIENT
Start: 2021-01-28 | End: 2021-01-28 | Stop reason: HOSPADM

## 2021-01-28 RX ORDER — PROPOFOL 10 MG/ML
INJECTION, EMULSION INTRAVENOUS PRN
Status: DISCONTINUED | OUTPATIENT
Start: 2021-01-28 | End: 2021-01-28 | Stop reason: SDUPTHER

## 2021-01-28 RX ORDER — LABETALOL HYDROCHLORIDE 5 MG/ML
5 INJECTION, SOLUTION INTRAVENOUS EVERY 10 MIN PRN
Status: DISCONTINUED | OUTPATIENT
Start: 2021-01-28 | End: 2021-01-28 | Stop reason: HOSPADM

## 2021-01-28 RX ORDER — SODIUM CHLORIDE 0.9 % (FLUSH) 0.9 %
10 SYRINGE (ML) INJECTION EVERY 12 HOURS SCHEDULED
Status: DISCONTINUED | OUTPATIENT
Start: 2021-01-28 | End: 2021-01-28 | Stop reason: HOSPADM

## 2021-01-28 RX ORDER — LIDOCAINE HYDROCHLORIDE 10 MG/ML
1 INJECTION, SOLUTION EPIDURAL; INFILTRATION; INTRACAUDAL; PERINEURAL
Status: DISCONTINUED | OUTPATIENT
Start: 2021-01-28 | End: 2021-01-28 | Stop reason: HOSPADM

## 2021-01-28 RX ORDER — SODIUM CHLORIDE 0.9 % (FLUSH) 0.9 %
10 SYRINGE (ML) INJECTION PRN
Status: DISCONTINUED | OUTPATIENT
Start: 2021-01-28 | End: 2021-01-28 | Stop reason: HOSPADM

## 2021-01-28 RX ORDER — MEPERIDINE HYDROCHLORIDE 50 MG/ML
12.5 INJECTION INTRAMUSCULAR; INTRAVENOUS; SUBCUTANEOUS EVERY 5 MIN PRN
Status: DISCONTINUED | OUTPATIENT
Start: 2021-01-28 | End: 2021-01-28 | Stop reason: HOSPADM

## 2021-01-28 RX ORDER — MORPHINE SULFATE 2 MG/ML
2 INJECTION, SOLUTION INTRAMUSCULAR; INTRAVENOUS EVERY 5 MIN PRN
Status: DISCONTINUED | OUTPATIENT
Start: 2021-01-28 | End: 2021-01-28 | Stop reason: HOSPADM

## 2021-01-28 RX ADMIN — LIDOCAINE HYDROCHLORIDE 100 MG: 20 INJECTION, SOLUTION EPIDURAL; INFILTRATION; INTRACAUDAL; PERINEURAL at 08:56

## 2021-01-28 RX ADMIN — PROPOFOL 20 MG: 10 INJECTION, EMULSION INTRAVENOUS at 08:58

## 2021-01-28 RX ADMIN — PROPOFOL 20 MG: 10 INJECTION, EMULSION INTRAVENOUS at 09:01

## 2021-01-28 RX ADMIN — SODIUM CHLORIDE, POTASSIUM CHLORIDE, SODIUM LACTATE AND CALCIUM CHLORIDE: 600; 310; 30; 20 INJECTION, SOLUTION INTRAVENOUS at 08:09

## 2021-01-28 RX ADMIN — PROPOFOL 20 MG: 10 INJECTION, EMULSION INTRAVENOUS at 09:05

## 2021-01-28 RX ADMIN — PROPOFOL 20 MG: 10 INJECTION, EMULSION INTRAVENOUS at 09:10

## 2021-01-28 RX ADMIN — PROPOFOL 30 MG: 10 INJECTION, EMULSION INTRAVENOUS at 08:57

## 2021-01-28 ASSESSMENT — PULMONARY FUNCTION TESTS
PIF_VALUE: 1
PIF_VALUE: 0
PIF_VALUE: 1
PIF_VALUE: 0

## 2021-01-28 ASSESSMENT — PAIN SCALES - GENERAL
PAINLEVEL_OUTOF10: 0
PAINLEVEL_OUTOF10: 0

## 2021-01-28 ASSESSMENT — PAIN - FUNCTIONAL ASSESSMENT: PAIN_FUNCTIONAL_ASSESSMENT: 0-10

## 2021-01-28 NOTE — OP NOTE
[facility]     PROCEDURE DATE: 01/28/21    REFERRING PHYSICIAN: No ref. provider found     PRIMARY CARE PROVIDER: Marcel Covington MD    ATTENDING PHYSICIAN: Justino Brennan MD     HISTORY: Mr. Edin Fish is a 69 y.o. male who presents to the [place] [address] Endoscopy unit for colonoscopy. The patient's clinical history is remarkable for colon polyps. He is currently medically stable and appropriate for the planned procedure. PREOPERATIVE DIAGNOSIS: previous adenomatous polyp. PROCEDURES:   Transanal Colonoscopy with polypectomy (cold biopsy). POSTPROCEDURE DIAGNOSIS:  2 polyps removed  Mild left-sided diverticulosis  Moderate internal hemorrhoids    SPECIMENS: polyps    MEDICATIONS:     MAC per anesthesia    EBL: minimal    INSTRUMENT: Olympus PCF-H190 AL flexible Colonoscope. PREPARATION: The nature and character of the procedure as well as risks, benefits, and alternatives were discussed with the patient and informed consent was obtained. Complications were said to include, but were not limited to: medication allergy, medication reaction, cardiovascular and respiratory problems, bleeding, perforation, infection, and/or missed diagnosis. Following arrival in the endoscopy room, the patient was placed in the left lateral decubitus position and final time-out accomplished in the presence of the nursing staff. Baseline vital signs were obtained and reviewed, and IV sedation was subsequently initiated. FINDINGS: Rectal examination demonstrated no significant visible external abnormality and digital palpation was unremarkable. Following adequate conscious sedation the colonoscope was introduced and advanced under direct visualization to the terminal ileum. The bowel preparation was felt to be mostly adequate. This included small amounts of thick stool that mostly was able to be adequately irrigated and aspirated. Cecal intubation time was 2 minutes. Once maximally inserted, the endoscope was withdrawn and the mucosa was carefully inspected. The mucosal exam was normal.  4 mm polyp was removed from ascending colon by cold biopsy forceps. 5 mm polyp was removed from transverse colon by cold biopsy forceps. Mild diverticulosis was noted in sigmoid and descending colon. Retroflexion was performed in the rectum and showed moderate internal hemorrhoids. Withdrawal time was 9 minutes. RECOMMENDATIONS:   1) Follow up with referring provider, as previously scheduled. 2) Follow up on pathology report. 3) Okay to restart blood thinners today.   4) Continue interval colon cancer surveillance (i.e repeat colonoscopy in 5 years)        Karena Mar

## 2021-01-28 NOTE — OP NOTE
[facility]     PROCEDURE DATE: 01/28/21    REFERRING PHYSICIAN: No ref. provider found     PRIMARY CARE PROVIDER: Ewa Zarate MD    ATTENDING PHYSICIAN: Gideon Zhou MD     HISTORY: Mr. Markel Amaya is a 69 y.o. male who presents to the [place] [address] Endoscopy unit for upper endoscopy. The patient's clinical history is remarkable for chronic GERD. He is currently medically stable and appropriate for the planned procedure. PREOPERATIVE DIAGNOSIS: chronic GERD. PROCEDURES:   1) Transoral Upper Endoscopy. POSTOPERATIVE DIAGNOSIS:   Small sliding antral hernia  Distal esophageal changes suggestive of GERD    SPECIMENS: none    MEDICATIONS:   MAC per anesthesia    EBL: minimal    INSTRUMENT: Olympus GIF-H190 flexible Gastroscope. PREPARATION: The nature and character of the procedure as well as risks, benefits, and alternatives were discussed with the patient and informed consent was obtained. Complications were said to include, but were not limited to: medication allergy, medication reaction, cardiovascular and respiratory problems, bleeding, perforation, infection, and/or missed diagnosis. Following arrival in the endoscopy room, the patient was placed in the left lateral decubitus position and final time-out accomplished in the presence of the nursing staff. Baseline vital signs were obtained and reviewed, and IV sedation was subsequently initiated. FINDINGS:   Esophagus: The esophagus was inspected to the Z-line. The endoscopic exam showed irregular Z-line suggestive of GERD. Small sliding antral hernia. Stomach: The stomach was inspected in both forward and retroflex fashion and was appropriately distensible. The cardia, fundus, incisura, antrum and pylorus were identified via direct visualization. The endoscopic exam showed no pathology.

## 2021-01-28 NOTE — H&P
History and Physical Update    Pt Name: Danitza Cadena  MRN: [id number]  YOB: 1950  Date of evaluation: 1/28/2021      [x] I have reviewed the Gastroenterology Progress Note by Dr Anjelica Kumar dated 1/11/21 which meets the criteria for an Interval History and Physical note and is attached below . [x] I have examined  Danitza Cadena , a 68 yo male with multiple comorbidities managed by specialist.  There are no changes to the patient who is scheduled for a colorectal cancer screening, by Dr Tiarra Cain for   The patient denies new health changes, fever, chills, wheezing, cough, increased SOB, chest pain, open sores or wounds. Hx past PE and DVT Follows with Cardiology  Patient on longterm anticoagulant therapy Cardiac clearance by Dr Claribel Logan 1/14/21 and advised to stop Plavix  5 days   before procedure and considered a low risk. Hematologist,  Dr Corona Gaston manages patients Coumadin for his PE/DVT  Last Plavix and Coumadin 1/23/21       Vital signs: /86   Pulse 66   Temp 96.3 °F (35.7 °C) (Oral)   Resp 16   Ht 5' 6\" (1.676 m)   Wt 240 lb (108.9 kg)   SpO2 99%   BMI 38.74 kg/m²     Allergies:  Patient has no known allergies. Medications:    Prior to Admission medications    Medication Sig Start Date End Date Taking? Authorizing Provider   polyethylene glycol (GLYCOLAX) 17 GM/SCOOP powder Use as directed by following your patient instructions given by office.  1/12/21  Yes Anjelica Kumar MD   bisacodyl (DULCOLAX) 5 MG EC tablet TAKE 4 TABS AS DIRECTED BY PHYSICIAN OFFICE 1/12/21  Yes Anjelica Kumar MD   carvedilol (COREG) 6.25 MG tablet Take 1 tablet by mouth daily 8/25/20  Yes Historical Provider, MD   pantoprazole (PROTONIX) 20 MG tablet Take 20 mg by mouth daily   Yes Historical Provider, MD   buPROPion (WELLBUTRIN SR) 150 MG extended release tablet Take 150 mg by mouth 2 times daily   Yes Historical Provider, MD Multiple Vitamins-Minerals (THERAPEUTIC MULTIVITAMIN-MINERALS) tablet Take 1 tablet by mouth daily   Yes Historical Provider, MD   atenolol (TENORMIN) 25 MG tablet TAKE 1 TABLET TWICE DAILY 9/14/18  Yes SARAH Anderson CNP   citalopram (CELEXA) 40 MG tablet TAKE 1 TABLET EVERY DAY 2/26/18  Yes SARAH Anderson CNP   donepezil (ARICEPT) 10 MG tablet TAKE 1 TABLET EVERY DAY 12/26/17  Yes Belgica Woods MD   atorvastatin (LIPITOR) 40 MG tablet TAKE 1 TABLET EVERY DAY 12/26/17  Yes Belgica Woods MD   losartan (COZAAR) 100 MG tablet TAKE 1 TABLET EVERY DAY 12/26/17  Yes Belgica Woods MD   fluticasone Satnam Shadow) 50 MCG/ACT nasal spray USE 2 SPRAYS IN EACH NOSTRIL EVERY DAY 6/28/16  Yes SARAH Anderson CNP   SYMBICORT 160-4.5 MCG/ACT AERO Inhale 2 puffs into the lungs daily 3/15/16  Yes Historical Provider, MD   magnesium citrate solution Take 296 mLs by mouth once for 1 dose 1/12/21 1/12/21  Justino Brennan MD   acetaminophen-codeine (TYLENOL/CODEINE #3) 300-30 MG per tablet Take 1 tablet by mouth every 8 hours as needed for Pain. Historical Provider, MD   clopidogrel (PLAVIX) 75 MG tablet Take 75 mg by mouth daily    Historical Provider, MD   acetaminophen (TYLENOL) 500 MG tablet Take 1,000 mg by mouth 3 times daily as needed for Pain    Historical Provider, MD   warfarin (COUMADIN) 5 MG tablet Take 5 mg daily or as directed by Gal Fox 12/14/18   Mynorcristina DO Marti         This is a 69 y.o.obese male who is pleasant, cooperative, alert and oriented x3, in no acute distress. Heart: Heart sounds are normal.  HR 66 regular rate and rhythm without murmur, gallop or rub. Lungs: Normal respiratory effort with equal expansion, good air exchange, unlabored and clear to auscultation without wheezes or rales bilaterally   Abdomen: round, obese, nontender, nondistended with bowel sounds.        Labs:  Recent Labs     01/14/21  1545   INR 1.8 PROTIME 18.3*       Recent Labs     01/24/21  0900   COVID19       Not Detected             WagnerMirza Rosa SMITH, ANP-BC  Electronically signed 1/28/2021 at 8:09 Kye Teague MD   Physician   Specialty:  Gastroenterology   Progress Notes   Signed   Encounter Date:  1/11/2021         Related encounter: Office Visit from 1/11/2021 in [address]             Reason for Referral:   Tom Norman MD  [address],  [address]          Chief Complaint   Patient presents with    Colonoscopy       Patient is taking Plavix and Coumadin and is time for a Colonoscopy. HISTORY OF PRESENT ILLNESS: Mr.Gerald DAWOOD Rascon is a 69 y.o. male , referred for evaluation of screening for colon cancer. He denies any symptoms. He is on Plavix and Coumadin by cardiology. He denies any blood in the stool. No change in bowel habits. No weight loss. No prior colonoscopy. No known family history of GI malignancy. Past Medical,Family, and Social History reviewed and does not contribute to the patient presentingcondition. Patient's PMH/PSH,SH,PSYCH Hx, MEDs, ALLERGIES, and ROS were all reviewed and updated in the appropriate sections. PAST MEDICAL HISTORY:  Past Medical History        Past Medical History:   Diagnosis Date    Acute pansinusitis      Allergic rhinitis      Asthma      Asthma      Dementia without behavioral disturbance (HCC)      Gastroesophageal reflux      History of DVT (deep vein thrombosis) 8/27/2015    Hx of blood clots      Hyperlipidemia      Hyperlipidemia LDL goal < 100      Hypertension      Hypertension, benign      Major depression, chronic 8/27/2015    Osteoarthritis      Pulmonary emboli ([facility] [address] 2007 approx.      bilateral    Severe obesity (BMI 35.0-35.9 with comorbidity) ([facility] [address] 8/27/2015    Sleep apnea              Past Surgical History         Past Surgical History:   fluticasone (FLONASE) 50 MCG/ACT nasal spray, USE 2 SPRAYS IN EACH NOSTRIL EVERY DAY, Disp: 3 Bottle, Rfl: 3    SYMBICORT 160-4.5 MCG/ACT AERO, Inhale 2 puffs into the lungs daily, Disp: , Rfl:         ALLERGIES:   No Known Allergies     FAMILY HISTORY:   Family History             Problem Relation Age of Onset    Heart Disease Father      High Blood Pressure Father                 SOCIAL HISTORY:   Social History               Socioeconomic History    Marital status:        Spouse name: Not on file    Number of children: Not on file    Years of education: Not on file    Highest education level: Not on file   Occupational History    Occupation: packaging       Employer: [employer] Financial resource strain: Not on file    Food insecurity       Worry: Not on file       Inability: Not on file    Transportation needs       Medical: Not on file       Non-medical: Not on file   Tobacco Use    Smoking status: Former Smoker    Smokeless tobacco: Never Used   Substance and Sexual Activity    Alcohol use: No       Alcohol/week: 0.0 standard drinks       Comment: very rare    Drug use: No    Sexual activity: Not on file   Lifestyle    Physical activity       Days per week: Not on file       Minutes per session: Not on file    Stress: Not on file   Relationships    Social connections       Talks on phone: Not on file       Gets together: Not on file       Attends Sikh service: Not on file       Active member of club or organization: Not on file       Attends meetings of clubs or organizations: Not on file       Relationship status: Not on file    Intimate partner violence       Fear of current or ex partner: Not on file       Emotionally abused: Not on file       Physically abused: Not on file       Forced sexual activity: Not on file   Other Topics Concern    Not on file   Social History Narrative    Not on file REVIEW OF SYSTEMS: A 12-point review of systemswas obtained and pertinent positives and negatives were enumerated above in the history of present illness. All other reviewed systems / symptoms were negative. Review of Systems   Constitutional: Negative. Negative for appetite change, fatigue and unexpected weight change. HENT: Negative. Negative for dental problem, postnasal drip, sinus pressure, sore throat, trouble swallowing and voice change. Eyes: Positive for visual disturbance. Respiratory: Negative. Negative for cough, choking and wheezing. Cardiovascular: Negative. Negative for chest pain, palpitations and leg swelling. Gastrointestinal: Negative. Negative for abdominal distention, abdominal pain, anal bleeding, blood in stool, constipation, diarrhea, nausea, rectal pain and vomiting. Endocrine: Negative. Genitourinary: Negative. Negative for difficulty urinating. Musculoskeletal: Positive for arthralgias. Negative for back pain, gait problem and myalgias. Allergic/Immunologic: Negative. Negative for environmental allergies and food allergies. Neurological: Negative. Negative for dizziness, weakness, light-headedness, numbness and headaches. Hematological: Negative. Does not bruise/bleed easily. Psychiatric/Behavioral: Negative. Negative for sleep disturbance. The patient is not nervous/anxious.                 LABORATORY DATA: Reviewed        Lab Results   Component Value Date     WBC 5.3 12/09/2020     HGB 14.1 12/09/2020     HCT 42.9 12/09/2020     MCV 88.1 12/09/2020      12/09/2020      12/09/2020     K 4.1 12/09/2020      12/09/2020     CO2 20 12/09/2020     BUN 16 12/09/2020     CREATININE 1.07 12/09/2020     LABALBU 4.2 12/09/2020     BILITOT 0.53 12/09/2020     ALKPHOS 92 12/09/2020     AST 22 12/09/2020     ALT 21 12/09/2020     INR 2.0 12/09/2020            Lab Results   Component Value Date     RBC 4.87 12/09/2020     HGB 14.1 12/09/2020 MCV 88.1 12/09/2020     MCH 29.0 12/09/2020     MCHC 32.9 12/09/2020     RDW 14.2 12/09/2020     MPV 11.1 12/09/2020     BASOPCT 1 12/09/2020     LYMPHSABS 1.59 12/09/2020     MONOSABS 0.65 12/09/2020     NEUTROABS 2.59 12/09/2020     EOSABS 0.33 12/09/2020     BASOSABS 0.07 12/09/2020            DIAGNOSTIC TESTING:      No results found. There were no vitals taken for this visit. PHYSICAL EXAMINATION: Vital signs reviewed per the nursing documentation. There is no height or weight on file to calculate BMI. Physical Exam        I personally reviewed the nurse's notes and documentation and I agree with her notes. General: alert, appears stated age and cooperative Psych: Normal. and Alert and oriented, appropriate affect. . Normal affect. Mentation normal  HEENT: PERRLA. Clear conjunctivae and sclerae. Moist oral mucosae, no lesions or ulcers. The neck is supple, without lymphadenopathy or jugular venous distension. No masses. Normal thyroid. Cardiovascular: S1 S2 RRR no rubs or murmurs. Pulmonary: clear BL. No accessory muscle usage. Abdominal Exam: Soft, NT ND, no hepato or spleno megaly, +BS, no ascites. Obese  No groin masses or lymphadenopathy. Extremities: no lower ext edema. Skin: Warm skin. No skin rash. No spider nevi palmar erythema nail dystrophy. Joint: No joint swelling or deformity. Neurological: intact sensory. DTR+. IMPRESSION: Mr. Kisha Bartlett is a 69 y.o. male with no GI issues. He presents for evaluation prior to screening colonoscopy. He is on Coumadin and Plavix. We will obtain clearance. He needs MAC for sedation given comorbidities. Spent 30 minutes providing patient education and counseling. Thank you for allowing me to participate in the care of Mr. Kisha Bartlett. For any further questions please do not hesitate to contact me. I have reviewed and agree with the MA/LPN ROS. Note is dictated utilizing voice recognition software. Unfortunately this leads to occasional typographical errors. Please contact our office if you have any questions.      Edilma Veras MD  [facility]  O: #[phone]            Revision History

## 2021-01-28 NOTE — ANESTHESIA POSTPROCEDURE EVALUATION
Department of Anesthesiology  Postprocedure Note    Patient: Gustabo Milian  MRN: [id number]  YOB: 1950  Date of evaluation: 1/28/2021  Time:  12:55 PM     Procedure Summary     Date: 01/28/21 Room / Location: [address]    Anesthesia Start: 7366 Anesthesia Stop: 1933    Procedure: COLONOSCOPY POLYPECTOMY SNARE/COLD BIOPSY (N/A ) Diagnosis: (DX SCREENING)    Surgeons: Dana Kulkarni MD Responsible Provider: Patton Gaucher, MD    Anesthesia Type: MAC ASA Status: 4          Anesthesia Type: MAC    Kelley Phase I: Kelley Score: 9    Kelley Phase II: Kelley Score: 7    Last vitals: Reviewed and per EMR flowsheets.        Anesthesia Post Evaluation    Patient location during evaluation: PACU  Patient participation: complete - patient participated  Level of consciousness: awake and alert  Pain score: 0  Airway patency: patent  Nausea & Vomiting: no nausea and no vomiting  Complications: no  Cardiovascular status: blood pressure returned to baseline  Respiratory status: acceptable  Hydration status: euvolemic

## 2021-01-28 NOTE — ANESTHESIA PRE PROCEDURE
Sumit Mark DO   atenolol (TENORMIN) 25 MG tablet TAKE 1 TABLET TWICE DAILY 9/14/18   SARAH Lau CNP   citalopram (CELEXA) 40 MG tablet TAKE 1 TABLET EVERY DAY 2/26/18   SARAH Anderson CNP   donepezil (ARICEPT) 10 MG tablet TAKE 1 TABLET EVERY DAY 12/26/17   Jacquie Max MD   atorvastatin (LIPITOR) 40 MG tablet TAKE 1 TABLET EVERY DAY 12/26/17   Jacquie Max MD   losartan (COZAAR) 100 MG tablet TAKE 1 TABLET EVERY DAY 12/26/17   Jacquie Max MD   fluticasone Jason Hamburger) 50 MCG/ACT nasal spray USE 2 SPRAYS IN EACH NOSTRIL EVERY DAY 6/28/16   SARAH Anderson CNP   SYMBICORT 160-4.5 MCG/ACT AERO Inhale 2 puffs into the lungs daily 3/15/16   Historical Provider, MD       Current medications:    Current Facility-Administered Medications   Medication Dose Route Frequency Provider Last Rate Last Admin    lactated ringers infusion   Intravenous Continuous Ndal Dali Hathaway MD        sodium chloride flush 0.9 % injection 10 mL  10 mL Intravenous 2 times per day Simone Knight MD        sodium chloride flush 0.9 % injection 10 mL  10 mL Intravenous PRN Simone Knight MD        lidocaine PF 1 % injection 1 mL  1 mL Intradermal Once PRN Simone Knight MD           Allergies:  No Known Allergies    Problem List:    Patient Active Problem List   Diagnosis Code    Hypertension, benign I10    Hyperlipidemia with target LDL less than 100 E78.5    Chronic allergic rhinitis J30.9    Asthma J45.909    Carpal tunnel syndrome G56.00    Cervical radicular pain M54.12    Spinal stenosis in cervical region M48.02    Degenerative disc disease, cervical M50.30    History of DVT (deep vein thrombosis) Z86.718    Major depression, chronic F32.9    Acute pansinusitis J01.40    Gastroesophageal reflux disease without esophagitis K21.9    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    Chronic pain of right ankle M25.571, G89.29    Traumatic arthritis of ankle M12.579    S/P ankle fusion Z98.1    Impacted cerumen of right ear H61.21    BMI 37.0-37.9, adult Z68.37    Dementia without behavioral disturbance (HCC) F03.90    PE (pulmonary thromboembolism) ([facility] I26.99    Lumbar foraminal stenosis M48.061    Lumbar degenerative disc disease M51.36    Chronic low back pain M54.5, G89.29       Past Medical History:        Diagnosis Date    Acute pansinusitis     Allergic rhinitis     Asthma     Asthma     Dementia without behavioral disturbance (HCC)     Gastroesophageal reflux     History of DVT (deep vein thrombosis) 8/27/2015    Hx of blood clots     Hyperlipidemia     Hyperlipidemia LDL goal < 100     Hypertension     Hypertension, benign     Major depression, chronic 8/27/2015    Osteoarthritis     Pulmonary emboli ([facility] [address] 2007 approx.     bilateral    Severe obesity (BMI 35.0-35.9 with comorbidity) ([facility] [address] 8/27/2015    Sleep apnea        Past Surgical History:        Procedure Laterality Date    ANKLE FUSION      CARDIAC CATHETERIZATION  05/05/2020    CARPAL TUNNEL RELEASE      JOINT REPLACEMENT      SHOULDER ARTHROPLASTY      SPINAL CORD STIMULATOR SURGERY      TONSILLECTOMY         Social History:    Social History     Tobacco Use    Smoking status: Former Smoker    Smokeless tobacco: Never Used   Substance Use Topics    Alcohol use: No     Alcohol/week: 0.0 standard drinks     Comment: very rare                                Counseling given: Not Answered      Vital Signs (Current):   Vitals:    01/28/21 0742 01/28/21 0747   BP: 136/86    Pulse: 66    Resp: 16    Temp: 96.3 °F (35.7 °C)    TempSrc: Oral    SpO2:  99%   Weight: 240 lb (108.9 kg)    Height: 5' 6\" (1.676 m)                                               BP Readings from Last 3 Encounters:   01/28/21 136/86   10/02/20 (!) 160/80   08/31/20 (!) 154/71       NPO Status: Time of last liquid consumption: 2330                        Time of last solid consumption: 1800                        Date of last liquid CPAP,  decreased breath sounds,  asthma:                            Cardiovascular:    (+) hypertension:, CAD: obstructive, CABG/stent: no interval change,         Rhythm: regular  Rate: normal                    Neuro/Psych:   (+) psychiatric history: stable with treatment            GI/Hepatic/Renal:   (+) GERD: well controlled, morbid obesity          Endo/Other: Negative Endo/Other ROS                    Abdominal:           Vascular:   + PE. Anesthesia Plan      MAC     ASA 4       Induction: intravenous. Anesthetic plan and risks discussed with patient. Use of blood products discussed with patient whom. Plan discussed with CRNA.                 Sekou Carlson MD   1/28/2021

## 2021-01-29 LAB — SURGICAL PATHOLOGY REPORT: NORMAL

## 2021-02-04 ENCOUNTER — HOSPITAL ENCOUNTER (OUTPATIENT)
Age: 71
Setting detail: SPECIMEN
Discharge: HOME | End: 2021-02-04
Payer: MEDICARE

## 2021-02-04 LAB
INR BLD: 1.4
PROTHROMBIN TIME: 14.4 SEC (ref 9–12)

## 2021-02-08 PROBLEM — K63.5 HYPERPLASTIC COLON POLYP: Status: ACTIVE | Noted: 2021-02-08

## 2021-02-22 ENCOUNTER — HOSPITAL ENCOUNTER (OUTPATIENT)
Age: 71
Setting detail: SPECIMEN
Discharge: HOME | End: 2021-02-22
Payer: MEDICARE

## 2021-02-22 LAB
INR BLD: 2.1
PROTHROMBIN TIME: 21.1 SEC (ref 9.1–12.3)

## 2021-05-14 ENCOUNTER — HOSPITAL ENCOUNTER (OUTPATIENT)
Age: 71
Setting detail: SPECIMEN
Discharge: HOME | End: 2021-05-14
Payer: MEDICARE

## 2021-05-14 LAB
ABSOLUTE EOS #: 0.36 K/UL (ref 0–0.44)
ABSOLUTE IMMATURE GRANULOCYTE: <0.03 K/UL (ref 0–0.3)
ABSOLUTE LYMPH #: 1.65 K/UL (ref 1.1–3.7)
ABSOLUTE MONO #: 0.82 K/UL (ref 0.1–1.2)
ALBUMIN SERPL-MCNC: 4.3 G/DL (ref 3.5–5.2)
ALBUMIN/GLOBULIN RATIO: 1.8 (ref 1–2.5)
ALP BLD-CCNC: 112 U/L (ref 40–129)
ALT SERPL-CCNC: 19 U/L (ref 5–41)
ANION GAP SERPL CALCULATED.3IONS-SCNC: 12 MMOL/L (ref 9–17)
AST SERPL-CCNC: 20 U/L
BASOPHILS # BLD: 1 % (ref 0–2)
BASOPHILS ABSOLUTE: 0.09 K/UL (ref 0–0.2)
BILIRUB SERPL-MCNC: 0.28 MG/DL (ref 0.3–1.2)
BUN BLDV-MCNC: 12 MG/DL (ref 8–23)
BUN/CREAT BLD: ABNORMAL (ref 9–20)
CALCIUM SERPL-MCNC: 9.4 MG/DL (ref 8.6–10.4)
CHLORIDE BLD-SCNC: 104 MMOL/L (ref 98–107)
CO2: 24 MMOL/L (ref 20–31)
CREAT SERPL-MCNC: 0.89 MG/DL (ref 0.7–1.2)
DIFFERENTIAL TYPE: ABNORMAL
EOSINOPHILS RELATIVE PERCENT: 6 % (ref 1–4)
GFR AFRICAN AMERICAN: >60 ML/MIN
GFR NON-AFRICAN AMERICAN: >60 ML/MIN
GFR SERPL CREATININE-BSD FRML MDRD: ABNORMAL ML/MIN/{1.73_M2}
GFR SERPL CREATININE-BSD FRML MDRD: ABNORMAL ML/MIN/{1.73_M2}
GLUCOSE BLD-MCNC: 127 MG/DL (ref 70–99)
HCT VFR BLD CALC: 41.7 % (ref 40.7–50.3)
HEMOGLOBIN: 13.2 G/DL (ref 13–17)
IMMATURE GRANULOCYTES: 0 %
INR BLD: 1.6
LYMPHOCYTES # BLD: 27 % (ref 24–43)
MCH RBC QN AUTO: 28.3 PG (ref 25.2–33.5)
MCHC RBC AUTO-ENTMCNC: 31.7 G/DL (ref 28.4–34.8)
MCV RBC AUTO: 89.3 FL (ref 82.6–102.9)
MONOCYTES # BLD: 13 % (ref 3–12)
NRBC AUTOMATED: 0 PER 100 WBC
PDW BLD-RTO: 14.4 % (ref 11.8–14.4)
PLATELET # BLD: 202 K/UL (ref 138–453)
PLATELET ESTIMATE: ABNORMAL
PMV BLD AUTO: 11.1 FL (ref 8.1–13.5)
POTASSIUM SERPL-SCNC: 4.1 MMOL/L (ref 3.7–5.3)
PROTHROMBIN TIME: 16.5 SEC (ref 9.1–12.3)
RBC # BLD: 4.67 M/UL (ref 4.21–5.77)
RBC # BLD: ABNORMAL 10*6/UL
SEG NEUTROPHILS: 53 % (ref 36–65)
SEGMENTED NEUTROPHILS ABSOLUTE COUNT: 3.28 K/UL (ref 1.5–8.1)
SODIUM BLD-SCNC: 140 MMOL/L (ref 135–144)
TOTAL PROTEIN: 6.7 G/DL (ref 6.4–8.3)
WBC # BLD: 6.2 K/UL (ref 3.5–11.3)
WBC # BLD: ABNORMAL 10*3/UL

## 2021-05-24 ENCOUNTER — HOSPITAL ENCOUNTER (OUTPATIENT)
Age: 71
Setting detail: SPECIMEN
Discharge: HOME | End: 2021-05-24
Payer: MEDICARE

## 2021-05-24 LAB
-: ABNORMAL
AMORPHOUS: ABNORMAL
BACTERIA: ABNORMAL
BILIRUBIN URINE: NEGATIVE
CASTS UA: ABNORMAL /LPF (ref 0–2)
COLOR: YELLOW
COMMENT UA: ABNORMAL
CRYSTALS, UA: ABNORMAL /HPF
EPITHELIAL CELLS UA: ABNORMAL /HPF (ref 0–5)
ESTIMATED AVERAGE GLUCOSE: 120 MG/DL
GLUCOSE URINE: NEGATIVE
HBA1C MFR BLD: 5.8 % (ref 4–6)
HCT VFR BLD CALC: 41.7 % (ref 40.7–50.3)
HEMOGLOBIN: 13.4 G/DL (ref 13–17)
INR BLD: 2.1
KETONES, URINE: NEGATIVE
LEUKOCYTE ESTERASE, URINE: NEGATIVE
MCH RBC QN AUTO: 27.7 PG (ref 25.2–33.5)
MCHC RBC AUTO-ENTMCNC: 32.1 G/DL (ref 28.4–34.8)
MCV RBC AUTO: 86.3 FL (ref 82.6–102.9)
MUCUS: ABNORMAL
NITRITE, URINE: NEGATIVE
NRBC AUTOMATED: 0 PER 100 WBC
OTHER OBSERVATIONS UA: ABNORMAL
PDW BLD-RTO: 14.2 % (ref 11.8–14.4)
PH UA: 5 (ref 5–8)
PLATELET # BLD: 218 K/UL (ref 138–453)
PMV BLD AUTO: 11.5 FL (ref 8.1–13.5)
PROTEIN UA: NEGATIVE
PROTHROMBIN TIME: 20.7 SEC (ref 9.1–12.3)
RBC # BLD: 4.83 M/UL (ref 4.21–5.77)
RBC UA: ABNORMAL /HPF (ref 0–2)
RENAL EPITHELIAL, UA: ABNORMAL /HPF
SPECIFIC GRAVITY UA: 1.02 (ref 1–1.03)
TRICHOMONAS: ABNORMAL
TURBIDITY: ABNORMAL
URINE HGB: NEGATIVE
UROBILINOGEN, URINE: NORMAL
WBC # BLD: 5.5 K/UL (ref 3.5–11.3)
WBC UA: ABNORMAL /HPF (ref 0–5)
YEAST: ABNORMAL

## 2021-05-25 LAB
ANION GAP SERPL CALCULATED.3IONS-SCNC: 11 MMOL/L (ref 9–17)
BUN BLDV-MCNC: 14 MG/DL (ref 8–23)
BUN/CREAT BLD: ABNORMAL (ref 9–20)
CALCIUM SERPL-MCNC: 8.9 MG/DL (ref 8.6–10.4)
CHLORIDE BLD-SCNC: 105 MMOL/L (ref 98–107)
CO2: 21 MMOL/L (ref 20–31)
CREAT SERPL-MCNC: 1.01 MG/DL (ref 0.7–1.2)
CULTURE: NO GROWTH
GFR AFRICAN AMERICAN: >60 ML/MIN
GFR NON-AFRICAN AMERICAN: >60 ML/MIN
GFR SERPL CREATININE-BSD FRML MDRD: ABNORMAL ML/MIN/{1.73_M2}
GFR SERPL CREATININE-BSD FRML MDRD: ABNORMAL ML/MIN/{1.73_M2}
GLUCOSE BLD-MCNC: 138 MG/DL (ref 70–99)
Lab: NORMAL
POTASSIUM SERPL-SCNC: 4.2 MMOL/L (ref 3.7–5.3)
SODIUM BLD-SCNC: 137 MMOL/L (ref 135–144)
SPECIMEN DESCRIPTION: NORMAL

## 2021-06-11 ENCOUNTER — HOSPITAL ENCOUNTER (OUTPATIENT)
Age: 71
Setting detail: SPECIMEN
Discharge: HOME | End: 2021-06-11
Payer: MEDICARE

## 2021-06-11 LAB
INR BLD: 2.3
PROTHROMBIN TIME: 23.4 SEC (ref 9.1–12.3)

## 2021-07-29 ENCOUNTER — HOSPITAL ENCOUNTER (OUTPATIENT)
Age: 71
Setting detail: SPECIMEN
Discharge: HOME | End: 2021-07-29
Payer: MEDICARE

## 2021-07-29 LAB
INR BLD: 2.1
PROTHROMBIN TIME: 21.2 SEC (ref 9.1–12.3)

## 2021-09-14 ENCOUNTER — HOSPITAL ENCOUNTER (OUTPATIENT)
Age: 71
Setting detail: SPECIMEN
Discharge: HOME | End: 2021-09-14
Payer: MEDICARE

## 2021-09-14 LAB
ALT SERPL-CCNC: 20 U/L (ref 5–41)
AST SERPL-CCNC: 21 U/L
CHOLESTEROL, FASTING: 148 MG/DL
CHOLESTEROL/HDL RATIO: 3.3
HDLC SERPL-MCNC: 45 MG/DL
INR BLD: 2.5
LDL CHOLESTEROL: 61 MG/DL (ref 0–130)
PROTHROMBIN TIME: 25.1 SEC (ref 9.1–12.3)
TRIGLYCERIDE, FASTING: 212 MG/DL
VLDLC SERPL CALC-MCNC: ABNORMAL MG/DL (ref 1–30)

## 2021-10-18 ENCOUNTER — HOSPITAL ENCOUNTER (OUTPATIENT)
Age: 71
Setting detail: SPECIMEN
Discharge: HOME | End: 2021-10-18
Payer: MEDICARE

## 2021-10-18 LAB
ABSOLUTE EOS #: <0.03 K/UL (ref 0–0.44)
ABSOLUTE IMMATURE GRANULOCYTE: 0.16 K/UL (ref 0–0.3)
ABSOLUTE LYMPH #: 0.99 K/UL (ref 1.1–3.7)
ABSOLUTE MONO #: 0.6 K/UL (ref 0.1–1.2)
ALBUMIN SERPL-MCNC: 4.4 G/DL (ref 3.5–5.2)
ALBUMIN/GLOBULIN RATIO: 1.6 (ref 1–2.5)
ALP BLD-CCNC: 122 U/L (ref 40–129)
ALT SERPL-CCNC: 24 U/L (ref 5–41)
ANION GAP SERPL CALCULATED.3IONS-SCNC: 16 MMOL/L (ref 9–17)
AST SERPL-CCNC: 18 U/L
BASOPHILS # BLD: 0 % (ref 0–2)
BASOPHILS ABSOLUTE: 0.03 K/UL (ref 0–0.2)
BILIRUB SERPL-MCNC: 0.29 MG/DL (ref 0.3–1.2)
BUN BLDV-MCNC: 15 MG/DL (ref 8–23)
BUN/CREAT BLD: ABNORMAL (ref 9–20)
CALCIUM SERPL-MCNC: 9.3 MG/DL (ref 8.6–10.4)
CHLORIDE BLD-SCNC: 100 MMOL/L (ref 98–107)
CO2: 20 MMOL/L (ref 20–31)
CREAT SERPL-MCNC: 0.93 MG/DL (ref 0.7–1.2)
DIFFERENTIAL TYPE: ABNORMAL
EOSINOPHILS RELATIVE PERCENT: 0 % (ref 1–4)
GFR AFRICAN AMERICAN: >60 ML/MIN
GFR NON-AFRICAN AMERICAN: >60 ML/MIN
GFR SERPL CREATININE-BSD FRML MDRD: ABNORMAL ML/MIN/{1.73_M2}
GFR SERPL CREATININE-BSD FRML MDRD: ABNORMAL ML/MIN/{1.73_M2}
GLUCOSE BLD-MCNC: 239 MG/DL (ref 70–99)
HCT VFR BLD CALC: 43.6 % (ref 40.7–50.3)
HEMOGLOBIN: 14.2 G/DL (ref 13–17)
IMMATURE GRANULOCYTES: 2 %
INR BLD: 3.9
LYMPHOCYTES # BLD: 11 % (ref 24–43)
MCH RBC QN AUTO: 28.2 PG (ref 25.2–33.5)
MCHC RBC AUTO-ENTMCNC: 32.6 G/DL (ref 28.4–34.8)
MCV RBC AUTO: 86.7 FL (ref 82.6–102.9)
MONOCYTES # BLD: 7 % (ref 3–12)
NRBC AUTOMATED: 0 PER 100 WBC
PDW BLD-RTO: 14.8 % (ref 11.8–14.4)
PLATELET # BLD: 245 K/UL (ref 138–453)
PLATELET ESTIMATE: ABNORMAL
PMV BLD AUTO: 10.8 FL (ref 8.1–13.5)
POTASSIUM SERPL-SCNC: 4.6 MMOL/L (ref 3.7–5.3)
PROTHROMBIN TIME: 37.1 SEC (ref 9.1–12.3)
RBC # BLD: 5.03 M/UL (ref 4.21–5.77)
RBC # BLD: ABNORMAL 10*6/UL
SEG NEUTROPHILS: 80 % (ref 36–65)
SEGMENTED NEUTROPHILS ABSOLUTE COUNT: 6.95 K/UL (ref 1.5–8.1)
SODIUM BLD-SCNC: 136 MMOL/L (ref 135–144)
TOTAL PROTEIN: 7.1 G/DL (ref 6.4–8.3)
WBC # BLD: 8.7 K/UL (ref 3.5–11.3)
WBC # BLD: ABNORMAL 10*3/UL

## 2021-11-05 ENCOUNTER — HOSPITAL ENCOUNTER (OUTPATIENT)
Age: 71
Setting detail: SPECIMEN
Discharge: HOME | End: 2021-11-05
Payer: MEDICARE

## 2021-11-05 LAB
INR BLD: 2.5
PROTHROMBIN TIME: 24.7 SEC (ref 9.1–12.3)

## 2021-11-10 ENCOUNTER — HOSPITAL ENCOUNTER (OUTPATIENT)
Age: 71
Setting detail: SPECIMEN
Discharge: HOME | End: 2021-11-10
Payer: MEDICARE

## 2021-11-10 LAB
ALBUMIN SERPL-MCNC: 4.4 G/DL (ref 3.5–5.2)
ALBUMIN/GLOBULIN RATIO: 1.8 (ref 1–2.5)
ALP BLD-CCNC: 103 U/L (ref 40–129)
ALT SERPL-CCNC: 22 U/L (ref 5–41)
ANION GAP SERPL CALCULATED.3IONS-SCNC: 12 MMOL/L (ref 9–17)
AST SERPL-CCNC: 21 U/L
BILIRUB SERPL-MCNC: 0.49 MG/DL (ref 0.3–1.2)
BUN BLDV-MCNC: 14 MG/DL (ref 8–23)
BUN/CREAT BLD: ABNORMAL (ref 9–20)
CALCIUM SERPL-MCNC: 9.3 MG/DL (ref 8.6–10.4)
CHLORIDE BLD-SCNC: 102 MMOL/L (ref 98–107)
CHOLESTEROL, FASTING: 144 MG/DL
CHOLESTEROL/HDL RATIO: 3.1
CO2: 21 MMOL/L (ref 20–31)
CREAT SERPL-MCNC: 0.94 MG/DL (ref 0.7–1.2)
GFR AFRICAN AMERICAN: >60 ML/MIN
GFR NON-AFRICAN AMERICAN: >60 ML/MIN
GFR SERPL CREATININE-BSD FRML MDRD: ABNORMAL ML/MIN/{1.73_M2}
GFR SERPL CREATININE-BSD FRML MDRD: ABNORMAL ML/MIN/{1.73_M2}
GLUCOSE FASTING: 120 MG/DL (ref 70–99)
HCT VFR BLD CALC: 42.9 % (ref 40.7–50.3)
HDLC SERPL-MCNC: 47 MG/DL
HEMOGLOBIN: 14 G/DL (ref 13–17)
LDL CHOLESTEROL: 67 MG/DL (ref 0–130)
MCH RBC QN AUTO: 28.9 PG (ref 25.2–33.5)
MCHC RBC AUTO-ENTMCNC: 32.6 G/DL (ref 28.4–34.8)
MCV RBC AUTO: 88.5 FL (ref 82.6–102.9)
NRBC AUTOMATED: 0 PER 100 WBC
PDW BLD-RTO: 14.2 % (ref 11.8–14.4)
PLATELET # BLD: 215 K/UL (ref 138–453)
PMV BLD AUTO: 10.8 FL (ref 8.1–13.5)
POTASSIUM SERPL-SCNC: 4.1 MMOL/L (ref 3.7–5.3)
RBC # BLD: 4.85 M/UL (ref 4.21–5.77)
SODIUM BLD-SCNC: 135 MMOL/L (ref 135–144)
TOTAL PROTEIN: 6.8 G/DL (ref 6.4–8.3)
TRIGLYCERIDE, FASTING: 151 MG/DL
VITAMIN B-12: 358 PG/ML (ref 232–1245)
VLDLC SERPL CALC-MCNC: ABNORMAL MG/DL (ref 1–30)
WBC # BLD: 5.3 K/UL (ref 3.5–11.3)

## 2021-12-22 ENCOUNTER — HOSPITAL ENCOUNTER (OUTPATIENT)
Age: 71
Setting detail: SPECIMEN
Discharge: HOME | End: 2021-12-22

## 2021-12-22 LAB
INR BLD: 1.5
PROTHROMBIN TIME: 15.4 SEC (ref 9.1–12.3)

## 2022-01-10 ENCOUNTER — HOSPITAL ENCOUNTER (OUTPATIENT)
Age: 72
Setting detail: SPECIMEN
Discharge: HOME | End: 2022-01-10

## 2022-01-10 LAB
INR BLD: 2.4
PROTHROMBIN TIME: 23.5 SEC (ref 9.1–12.3)

## 2022-01-19 ENCOUNTER — HOSPITAL ENCOUNTER (OUTPATIENT)
Age: 72
Setting detail: SPECIMEN
Discharge: HOME | End: 2022-01-19

## 2022-01-19 LAB
INR BLD: 3.1
PROTHROMBIN TIME: 30 SEC (ref 9.1–12.3)

## 2022-02-28 ENCOUNTER — HOSPITAL ENCOUNTER (OUTPATIENT)
Age: 72
Setting detail: SPECIMEN
Discharge: HOME | End: 2022-02-28

## 2022-03-01 LAB
INR BLD: 2.9
PROTHROMBIN TIME: 28.5 SEC (ref 9.1–12.3)

## 2022-05-02 ENCOUNTER — HOSPITAL ENCOUNTER (OUTPATIENT)
Age: 72
Setting detail: SPECIMEN
Discharge: HOME | End: 2022-05-02

## 2022-05-02 LAB
ABSOLUTE EOS #: 0.36 K/UL (ref 0–0.44)
ABSOLUTE IMMATURE GRANULOCYTE: <0.03 K/UL (ref 0–0.3)
ABSOLUTE LYMPH #: 1.74 K/UL (ref 1.1–3.7)
ABSOLUTE MONO #: 0.86 K/UL (ref 0.1–1.2)
ALBUMIN SERPL-MCNC: 4.5 G/DL (ref 3.5–5.2)
ALBUMIN/GLOBULIN RATIO: 2 (ref 1–2.5)
ALP BLD-CCNC: 104 U/L (ref 40–129)
ALT SERPL-CCNC: 20 U/L (ref 5–41)
ANION GAP SERPL CALCULATED.3IONS-SCNC: 15 MMOL/L (ref 9–17)
AST SERPL-CCNC: 21 U/L
BASOPHILS # BLD: 2 % (ref 0–2)
BASOPHILS ABSOLUTE: 0.12 K/UL (ref 0–0.2)
BILIRUB SERPL-MCNC: 0.42 MG/DL (ref 0.3–1.2)
BUN BLDV-MCNC: 15 MG/DL (ref 8–23)
CALCIUM SERPL-MCNC: 9.4 MG/DL (ref 8.6–10.4)
CHLORIDE BLD-SCNC: 105 MMOL/L (ref 98–107)
CO2: 20 MMOL/L (ref 20–31)
CREAT SERPL-MCNC: 1.1 MG/DL (ref 0.7–1.2)
EOSINOPHILS RELATIVE PERCENT: 6 % (ref 1–4)
GFR AFRICAN AMERICAN: >60 ML/MIN
GFR NON-AFRICAN AMERICAN: >60 ML/MIN
GFR SERPL CREATININE-BSD FRML MDRD: ABNORMAL ML/MIN/{1.73_M2}
GLUCOSE BLD-MCNC: 121 MG/DL (ref 70–99)
HCT VFR BLD CALC: 42 % (ref 40.7–50.3)
HEMOGLOBIN: 14.1 G/DL (ref 13–17)
IMMATURE GRANULOCYTES: 0 %
INR BLD: 2.2
LYMPHOCYTES # BLD: 27 % (ref 24–43)
MCH RBC QN AUTO: 28.9 PG (ref 25.2–33.5)
MCHC RBC AUTO-ENTMCNC: 33.6 G/DL (ref 28.4–34.8)
MCV RBC AUTO: 86.1 FL (ref 82.6–102.9)
MONOCYTES # BLD: 14 % (ref 3–12)
NRBC AUTOMATED: 0 PER 100 WBC
PDW BLD-RTO: 14.4 % (ref 11.8–14.4)
PLATELET # BLD: 196 K/UL (ref 138–453)
PMV BLD AUTO: 11.1 FL (ref 8.1–13.5)
POTASSIUM SERPL-SCNC: 4.4 MMOL/L (ref 3.7–5.3)
PROTHROMBIN TIME: 21.9 SEC (ref 9.1–12.3)
RBC # BLD: 4.88 M/UL (ref 4.21–5.77)
SEG NEUTROPHILS: 51 % (ref 36–65)
SEGMENTED NEUTROPHILS ABSOLUTE COUNT: 3.27 K/UL (ref 1.5–8.1)
SODIUM BLD-SCNC: 140 MMOL/L (ref 135–144)
TOTAL PROTEIN: 6.7 G/DL (ref 6.4–8.3)
WBC # BLD: 6.4 K/UL (ref 3.5–11.3)

## 2022-05-25 ENCOUNTER — HOSPITAL ENCOUNTER (OUTPATIENT)
Dept: NEUROLOGY | Age: 72
Discharge: HOME | End: 2022-05-25
Payer: MEDICARE

## 2022-05-25 DIAGNOSIS — J45.909 ASTHMA WITHOUT STATUS ASTHMATICUS WITHOUT COMPLICATION, UNSPECIFIED ASTHMA SEVERITY, UNSPECIFIED WHETHER PERSISTENT: ICD-10-CM

## 2022-05-25 PROCEDURE — 94729 DIFFUSING CAPACITY: CPT

## 2022-05-25 PROCEDURE — 94060 EVALUATION OF WHEEZING: CPT

## 2022-05-25 PROCEDURE — 94726 PLETHYSMOGRAPHY LUNG VOLUMES: CPT

## 2022-05-25 RX ORDER — ALBUTEROL SULFATE 90 UG/1
2 AEROSOL, METERED RESPIRATORY (INHALATION) ONCE
Status: DISCONTINUED | OUTPATIENT
Start: 2022-05-25 | End: 2022-05-26 | Stop reason: HOSPADM

## 2022-05-31 LAB
DLCO %PRED: 73 %
DLCO PRED: NORMAL
DLCO/VA %PRED: NORMAL
DLCO/VA PRED: NORMAL
DLCO/VA: NORMAL
DLCO: NORMAL
EXPIRATORY TIME-POST: NORMAL
EXPIRATORY TIME: NORMAL
FEF 25-75% %CHNG: NORMAL
FEF 25-75% %PRED-POST: NORMAL
FEF 25-75% %PRED-PRE: NORMAL
FEF 25-75% PRED: NORMAL
FEF 25-75%-POST: NORMAL
FEF 25-75%-PRE: NORMAL
FEV1 %PRED-POST: 95 %
FEV1 %PRED-PRE: 96 %
FEV1 PRED: NORMAL
FEV1-POST: NORMAL
FEV1-PRE: NORMAL
FEV1/FVC %PRED-POST: NORMAL
FEV1/FVC %PRED-PRE: NORMAL
FEV1/FVC PRED: NORMAL
FEV1/FVC-POST: 109 %
FEV1/FVC-PRE: 110 %
FVC %PRED-POST: NORMAL
FVC %PRED-PRE: NORMAL
FVC PRED: NORMAL
FVC-POST: NORMAL
FVC-PRE: NORMAL
GAW %PRED: NORMAL
GAW PRED: NORMAL
GAW: NORMAL
IC %PRED: NORMAL
IC PRED: NORMAL
IC: NORMAL
MEP: NORMAL
MIP: NORMAL
MVV %PRED-PRE: NORMAL
MVV PRED: NORMAL
MVV-PRE: NORMAL
PEF %PRED-POST: NORMAL
PEF %PRED-PRE: NORMAL
PEF PRED: NORMAL
PEF%CHNG: NORMAL
PEF-POST: NORMAL
PEF-PRE: NORMAL
RAW %PRED: NORMAL
RAW PRED: NORMAL
RAW: NORMAL
RV %PRED: NORMAL
RV PRED: NORMAL
RV: NORMAL
SVC %PRED: NORMAL
SVC PRED: NORMAL
SVC: NORMAL
TLC %PRED: 86 %
TLC PRED: NORMAL
TLC: NORMAL
VA %PRED: NORMAL
VA PRED: NORMAL
VA: NORMAL
VTG %PRED: NORMAL
VTG PRED: NORMAL
VTG: NORMAL

## 2022-05-31 ASSESSMENT — PULMONARY FUNCTION TESTS
FEV1_PERCENT_PREDICTED_PRE: 96
FEV1/FVC_POST: 109
FEV1/FVC_PRE: 110
FEV1_PERCENT_PREDICTED_POST: 95

## 2022-06-01 NOTE — PROCEDURES
[address] [facility] [address]                [address] [place] [address], [address] [place] [address]                               PULMONARY FUNCTION    PATIENT NAME: Maciej Tamayo                    :        1950  MED REC NO:   [id number]                             ROOM:  ACCOUNT NO:   [de-identified]                           ADMIT DATE: 2022  PROVIDER:     Marjorie Alcantar MD    DATE OF PROCEDURE:  2022    TYPE OF STUDY:  Complete PFT. RESULTS:  The patient had FEV1 of 2.75, which is normal at 96%  predicted. FVC was 3.27, which is normal at 86% predicted. FEV1/FVC  was 84. The mid-flows were increased to 139% predicted. Total lung  capacity was normal at 86% predicted. The residual volume was normal at  94% predicted. Diffusion was mildly reduced at 73% predicted. Following bronchodilators, there was no significant change in the flows. IMPRESSION:  Mild diffusion impairment without obstruction or  restriction. No significant response to bronchodilators. Clinical  correlation is recommended.         Tanesha Mcleod MD    D: 2022 12:45:52       T: 2022 12:48:20     [email or web address]  Job#: [id number]     Doc#: [id number]    CC:

## 2022-06-14 ENCOUNTER — HOSPITAL ENCOUNTER (OUTPATIENT)
Age: 72
Setting detail: SPECIMEN
Discharge: HOME | End: 2022-06-14

## 2022-06-14 LAB
INR BLD: 2.3
PROTHROMBIN TIME: 23.2 SEC (ref 9.1–12.3)

## 2022-07-14 ENCOUNTER — HOSPITAL ENCOUNTER (OUTPATIENT)
Age: 72
Setting detail: SPECIMEN
Discharge: HOME | End: 2022-07-14

## 2022-07-14 LAB
INR BLD: 1.9
PROTHROMBIN TIME: 19.1 SEC (ref 9.1–12.3)

## 2022-07-25 ENCOUNTER — HOSPITAL ENCOUNTER (OUTPATIENT)
Age: 72
Setting detail: SPECIMEN
Discharge: HOME | End: 2022-07-25

## 2022-07-25 LAB
INR BLD: 1
PROTHROMBIN TIME: 11.1 SEC (ref 9.1–12.3)

## 2022-07-29 ENCOUNTER — HOSPITAL ENCOUNTER (OUTPATIENT)
Age: 72
Setting detail: SPECIMEN
Discharge: HOME | End: 2022-07-29

## 2022-07-29 LAB
INR BLD: 1.3
PROTHROMBIN TIME: 13.8 SEC (ref 9.1–12.3)

## 2022-08-01 ENCOUNTER — HOSPITAL ENCOUNTER (OUTPATIENT)
Age: 72
Setting detail: SPECIMEN
Discharge: HOME | End: 2022-08-01

## 2022-08-01 LAB
INR BLD: 1.6
PROTHROMBIN TIME: 16.6 SEC (ref 9.1–12.3)

## 2022-08-04 ENCOUNTER — HOSPITAL ENCOUNTER (OUTPATIENT)
Age: 72
Setting detail: SPECIMEN
Discharge: HOME | End: 2022-08-04

## 2022-08-04 LAB
INR BLD: 2
PROTHROMBIN TIME: 20.4 SEC (ref 9.1–12.3)

## 2022-08-08 ENCOUNTER — HOSPITAL ENCOUNTER (OUTPATIENT)
Age: 72
Setting detail: SPECIMEN
Discharge: HOME | End: 2022-08-08

## 2022-08-08 LAB
INR BLD: 3.1
PROTHROMBIN TIME: 29.9 SEC (ref 9.1–12.3)

## 2022-09-01 ENCOUNTER — HOSPITAL ENCOUNTER (OUTPATIENT)
Age: 72
Setting detail: SPECIMEN
Discharge: HOME | End: 2022-09-01

## 2022-09-01 LAB
INR BLD: 1.6
PROTHROMBIN TIME: 16 SEC (ref 9.1–12.3)

## 2022-09-08 ENCOUNTER — HOSPITAL ENCOUNTER (OUTPATIENT)
Age: 72
Setting detail: SPECIMEN
Discharge: HOME | End: 2022-09-08

## 2022-09-08 LAB
ABSOLUTE EOS #: 0.35 K/UL (ref 0–0.44)
ABSOLUTE IMMATURE GRANULOCYTE: <0.03 K/UL (ref 0–0.3)
ABSOLUTE LYMPH #: 1.84 K/UL (ref 1.1–3.7)
ABSOLUTE MONO #: 0.83 K/UL (ref 0.1–1.2)
ALBUMIN SERPL-MCNC: 4.5 G/DL (ref 3.5–5.2)
ALBUMIN/GLOBULIN RATIO: 1.7 (ref 1–2.5)
ALP BLD-CCNC: 110 U/L (ref 40–129)
ALT SERPL-CCNC: 15 U/L (ref 5–41)
ANION GAP SERPL CALCULATED.3IONS-SCNC: 19 MMOL/L (ref 9–17)
AST SERPL-CCNC: 20 U/L
BASOPHILS # BLD: 2 % (ref 0–2)
BASOPHILS ABSOLUTE: 0.09 K/UL (ref 0–0.2)
BILIRUB SERPL-MCNC: 0.4 MG/DL (ref 0.3–1.2)
BUN BLDV-MCNC: 14 MG/DL (ref 8–23)
CALCIUM SERPL-MCNC: 9.1 MG/DL (ref 8.6–10.4)
CHLORIDE BLD-SCNC: 103 MMOL/L (ref 98–107)
CO2: 19 MMOL/L (ref 20–31)
CREAT SERPL-MCNC: 1.01 MG/DL (ref 0.7–1.2)
EOSINOPHILS RELATIVE PERCENT: 6 % (ref 1–4)
GFR AFRICAN AMERICAN: >60 ML/MIN
GFR NON-AFRICAN AMERICAN: >60 ML/MIN
GFR SERPL CREATININE-BSD FRML MDRD: ABNORMAL ML/MIN/{1.73_M2}
GLUCOSE FASTING: 106 MG/DL (ref 70–99)
HCT VFR BLD CALC: 42.5 % (ref 40.7–50.3)
HEMOGLOBIN: 13.1 G/DL (ref 13–17)
IMMATURE GRANULOCYTES: 0 %
INR BLD: 2.3
LYMPHOCYTES # BLD: 33 % (ref 24–43)
MCH RBC QN AUTO: 26.7 PG (ref 25.2–33.5)
MCHC RBC AUTO-ENTMCNC: 30.8 G/DL (ref 28.4–34.8)
MCV RBC AUTO: 86.6 FL (ref 82.6–102.9)
MONOCYTES # BLD: 15 % (ref 3–12)
NRBC AUTOMATED: 0 PER 100 WBC
PDW BLD-RTO: 14.9 % (ref 11.8–14.4)
PLATELET # BLD: 231 K/UL (ref 138–453)
PMV BLD AUTO: 11 FL (ref 8.1–13.5)
POTASSIUM SERPL-SCNC: 4.3 MMOL/L (ref 3.7–5.3)
PROTHROMBIN TIME: 23.1 SEC (ref 9.1–12.3)
RBC # BLD: 4.91 M/UL (ref 4.21–5.77)
RBC # BLD: ABNORMAL 10*6/UL
SEG NEUTROPHILS: 44 % (ref 36–65)
SEGMENTED NEUTROPHILS ABSOLUTE COUNT: 2.41 K/UL (ref 1.5–8.1)
SODIUM BLD-SCNC: 141 MMOL/L (ref 135–144)
TOTAL PROTEIN: 7.1 G/DL (ref 6.4–8.3)
WBC # BLD: 5.5 K/UL (ref 3.5–11.3)

## 2022-09-15 ENCOUNTER — HOSPITAL ENCOUNTER (OUTPATIENT)
Age: 72
Setting detail: SPECIMEN
Discharge: HOME | End: 2022-09-15

## 2022-09-15 LAB
INR BLD: 2.3
PROTHROMBIN TIME: 22.8 SEC (ref 9.1–12.3)

## 2022-09-29 ENCOUNTER — HOSPITAL ENCOUNTER (OUTPATIENT)
Age: 72
Setting detail: SPECIMEN
Discharge: HOME | End: 2022-09-29

## 2022-09-29 LAB
INR BLD: 2.3
PROTHROMBIN TIME: 21.3 SEC (ref 9.1–12.3)

## 2022-10-20 ENCOUNTER — HOSPITAL ENCOUNTER (OUTPATIENT)
Age: 72
Setting detail: SPECIMEN
Discharge: HOME | End: 2022-10-20

## 2022-10-20 LAB
ALT SERPL-CCNC: 15 U/L (ref 5–41)
AST SERPL-CCNC: 18 U/L
CHOLESTEROL, FASTING: 144 MG/DL
CHOLESTEROL/HDL RATIO: 3.3
HDLC SERPL-MCNC: 43 MG/DL
INR BLD: 2.2
LDL CHOLESTEROL: 71 MG/DL (ref 0–130)
PROTHROMBIN TIME: 21.9 SEC (ref 9.1–12.3)
TRIGLYCERIDE, FASTING: 148 MG/DL

## 2022-11-22 ENCOUNTER — HOSPITAL ENCOUNTER (OUTPATIENT)
Age: 72
Setting detail: SPECIMEN
Discharge: HOME | End: 2022-11-22

## 2022-11-22 LAB
INR BLD: 2.4
PROTHROMBIN TIME: 23.8 SEC (ref 9.1–12.3)

## 2023-01-06 ENCOUNTER — HOSPITAL ENCOUNTER (OUTPATIENT)
Age: 73
Setting detail: SPECIMEN
Discharge: HOME | End: 2023-01-06

## 2023-01-06 LAB
INR BLD: 3.2
PROTHROMBIN TIME: 31.4 SEC (ref 9.1–12.3)

## 2023-01-19 ENCOUNTER — HOSPITAL ENCOUNTER (OUTPATIENT)
Age: 73
Setting detail: SPECIMEN
Discharge: HOME | End: 2023-01-19

## 2023-01-20 LAB
INR BLD: 3.1
PROTHROMBIN TIME: 30.3 SEC (ref 9.1–12.3)

## 2023-01-25 ENCOUNTER — OFFICE VISIT (OUTPATIENT)
Dept: ORTHOPEDIC SURGERY | Age: 73
End: 2023-01-25
Payer: MEDICARE

## 2023-01-25 DIAGNOSIS — M79.602 LEFT ARM PAIN: Primary | ICD-10-CM

## 2023-01-25 DIAGNOSIS — M25.532 LEFT WRIST PAIN: ICD-10-CM

## 2023-01-25 PROCEDURE — 1036F TOBACCO NON-USER: CPT | Performed by: ORTHOPAEDIC SURGERY

## 2023-01-25 PROCEDURE — G8428 CUR MEDS NOT DOCUMENT: HCPCS | Performed by: ORTHOPAEDIC SURGERY

## 2023-01-25 PROCEDURE — G8484 FLU IMMUNIZE NO ADMIN: HCPCS | Performed by: ORTHOPAEDIC SURGERY

## 2023-01-25 PROCEDURE — 3017F COLORECTAL CA SCREEN DOC REV: CPT | Performed by: ORTHOPAEDIC SURGERY

## 2023-01-25 PROCEDURE — 99203 OFFICE O/P NEW LOW 30 MIN: CPT | Performed by: ORTHOPAEDIC SURGERY

## 2023-01-25 PROCEDURE — G8421 BMI NOT CALCULATED: HCPCS | Performed by: ORTHOPAEDIC SURGERY

## 2023-01-25 PROCEDURE — 1123F ACP DISCUSS/DSCN MKR DOCD: CPT | Performed by: ORTHOPAEDIC SURGERY

## 2023-01-25 NOTE — PROGRESS NOTES
Leanna King M.D.            [address] [facility] [address], [address] [facility]           Dept Phone: [phone]           Dept Fax:  [phone]           [place]          Dept Phone: [phone]           Dept Fax:  [phone]      Chief Compliant:  Chief Complaint   Patient presents with    Pain     Lt wrist        History of Present Illness: This is a 67 y.o. male who presents to the clinic today for evaluation / follow up of left wrist and finger numbness. Patient 1year-old right-hand-dominant gentleman who had a previous left carpal tunnel release performed by Dr. Michael Barker years ago. Patient is describing a tingling sensation in the ulnar nerve distribution his fingers. He also describes however that he has some tingling sensation in his neck and some pain radiating down his arm as well. He states that if he drops his arm to the side sometimes the tingling will go away. He is not had any brace for his elbow or any other had a treatment for this is going on for several months. .       Review of Systems   Constitutional: Negative for fever, chills, sweats. Eyes: Negative for changes in vision, or pain. HENT: Negative for ear ache, epistaxis, or sore throat. Respiratory/Cardio: Negative for Chest pain, palpitations, SOB, or cough. Gastrointestinal: Negative for abdominal pain, N/V/D. Genitourinary: Negative for dysuria, frequency, urgency, or hematuria. Neurological: Negative for headache, numbness, or weakness. Integumentary: Negative for rash, itching, laceration, or abrasion. Musculoskeletal: Positive for Pain (Lt wrist)       Physical Exam:  Constitutional: Patient is oriented to person, place, and time. Patient appears well-developed and well nourished.    HENT: Negative otherwise noted  Head: Normocephalic and Atraumatic  Nose: Normal  Eyes: Conjunctivae and EOM are normal  Neck: Normal range of motion Neck supple.    Respiratory/Cardio: Effort normal. No respiratory distress.  Musculoskeletal: Examination of the of the patient's left hand notes no obvious atrophy.  His previous carpal tunnel scar he has a little bit of tingling in the ulnar fingers but no Malik he has good  strength good interosseous strength good wrist motion and wrist strength.  With resisted flexion and wrist extension.  He has a negative Tinel's at Guyon's canal.  Very minimal Tinel's at the elbow.    Neurological: Patient is alert and oriented to person, place, and time. Normal strength. No sensory deficit.  Skin: Skin is warm and dry  Psychiatric: Behavior is normal. Thought content normal.  Nursing note and vitals reviewed.     Labs and Imaging:     XR taken today: None taken today  No results found.        Orders Placed This Encounter   Procedures    Marcos Bowens MD, Physical Medicine and Rehabilitation, [place]     Referral Priority:   Routine     Referral Type:   Eval and Treat     Referral Reason:   Specialty Services Required     Referred to Provider:   Marcos Miller MD     Requested Specialty:   Physical Medicine and Rehab     Number of Visits Requested:   1    EMG     Ulnar neuropathy vs cervical radiculopathy     Standing Status:   Future     Standing Expiration Date:   1/25/2024     Order Specific Question:   Which body part?     Answer:   EMG left upper extremity       Assessment and Plan:  1. Left arm pain    2. Left wrist pain            This is a 72 y.o. male who presents to the clinic today for evaluation / follow up of rule out ulnar neuropathy versus cervical radiculopathy left upper extremity.     Past History:    Current Outpatient Medications:     polyethylene glycol (GLYCOLAX) 17 GM/SCOOP powder, Use as directed by following your patient instructions given by office., Disp: 238 g, Rfl: 0    bisacodyl (DULCOLAX) 5  MG EC tablet, TAKE 4 TABS AS DIRECTED BY PHYSICIAN OFFICE, Disp: 4 tablet, Rfl: 0    magnesium citrate solution, Take 296 mLs by mouth once for 1 dose, Disp: 296 mL, Rfl: 0    acetaminophen-codeine (TYLENOL/CODEINE #3) 300-30 MG per tablet, Take 1 tablet by mouth every 8 hours as needed for Pain., Disp: , Rfl:     clopidogrel (PLAVIX) 75 MG tablet, Take 75 mg by mouth daily, Disp: , Rfl:     carvedilol (COREG) 6.25 MG tablet, Take 1 tablet by mouth daily, Disp: , Rfl:     pantoprazole (PROTONIX) 20 MG tablet, Take 20 mg by mouth daily, Disp: , Rfl:     buPROPion (WELLBUTRIN SR) 150 MG extended release tablet, Take 150 mg by mouth 2 times daily, Disp: , Rfl:     Multiple Vitamins-Minerals (THERAPEUTIC MULTIVITAMIN-MINERALS) tablet, Take 1 tablet by mouth daily, Disp: , Rfl:     acetaminophen (TYLENOL) 500 MG tablet, Take 1,000 mg by mouth 3 times daily as needed for Pain, Disp: , Rfl:     warfarin (COUMADIN) 5 MG tablet, Take 5 mg daily or as directed by [facility], Disp: 200 tablet, Rfl: 2    atenolol (TENORMIN) 25 MG tablet, TAKE 1 TABLET TWICE DAILY, Disp: 180 tablet, Rfl: 3    citalopram (CELEXA) 40 MG tablet, TAKE 1 TABLET EVERY DAY, Disp: 90 tablet, Rfl: 3    donepezil (ARICEPT) 10 MG tablet, TAKE 1 TABLET EVERY DAY, Disp: 90 tablet, Rfl: 3    atorvastatin (LIPITOR) 40 MG tablet, TAKE 1 TABLET EVERY DAY, Disp: 90 tablet, Rfl: 3    losartan (COZAAR) 100 MG tablet, TAKE 1 TABLET EVERY DAY, Disp: 90 tablet, Rfl: 3    fluticasone (FLONASE) 50 MCG/ACT nasal spray, USE 2 SPRAYS IN EACH NOSTRIL EVERY DAY, Disp: 3 Bottle, Rfl: 3    SYMBICORT 160-4.5 MCG/ACT AERO, Inhale 2 puffs into the lungs daily, Disp: , Rfl:   No Known Allergies  Social History     Socioeconomic History    Marital status:      Spouse name: Not on file    Number of children: Not on file    Years of education: Not on file    Highest education level: Not on file   Occupational History    Occupation: packaging     Employer: [employer]   Tobacco Use    Smoking status: Former    Smokeless tobacco: Never   Substance and Sexual Activity    Alcohol use: No     Alcohol/week: 0.0 standard drinks     Comment: very rare    Drug use: No    Sexual activity: Not on file   Other Topics Concern    Not on file   Social History Narrative    Not on file     Social Determinants of Health     Financial Resource Strain: Not on file   Food Insecurity: Not on file   Transportation Needs: Not on file   Physical Activity: Not on file   Stress: Not on file   Social Connections: Not on file   Intimate Partner Violence: Not on file   Housing Stability: Not on file     Past Medical History:   Diagnosis Date    Acute pansinusitis     Allergic rhinitis     Asthma     Asthma     Dementia without behavioral disturbance ([facility]     Gastroesophageal reflux     History of DVT (deep vein thrombosis) 8/27/2015    Hx of blood clots     Hyperlipidemia     Hyperlipidemia LDL goal < 100     Hypertension     Hypertension, benign     Major depression, chronic 8/27/2015    Osteoarthritis     Pulmonary emboli ([facility] 2007 approx. bilateral    Severe obesity (BMI 35.0-35.9 with comorbidity) [facility] 8/27/2015    Sleep apnea      Past Surgical History:   Procedure Laterality Date    ANKLE FUSION      CARDIAC CATHETERIZATION  05/05/2020    CARPAL TUNNEL RELEASE      COLONOSCOPY N/A 1/28/2021    COLONOSCOPY POLYPECTOMY SNARE/COLD BIOPSY performed by Kaleb Starks MD at [address] [place] [address]       Family History   Problem Relation Age of Onset    Heart Disease Father     High Blood Pressure Father    Plan  Patient be scheduled for EMG and NCV to the left upper extremity. Ask him to refrain from resting his elbow on an armrest or any hard surfaces.   We will await results of the studies to determine future intervention      Provider Attestation:  Annie Garcia, personally performed the services described in this documentation. All medical record entries made by the scribe were at my direction and in my presence. I have reviewed the chart and discharge instructions and agree that the records reflect my personal performance and is accurate and complete. Emile Shoemaker MD. 01/25/23      Please note that this chart was generated using voice recognition Dragon dictation software. Although every effort was made to ensure the accuracy of this automated transcription, some errors in transcription may have occurred.

## 2023-02-03 ENCOUNTER — HOSPITAL ENCOUNTER (OUTPATIENT)
Age: 73
Setting detail: SPECIMEN
Discharge: HOME | End: 2023-02-03

## 2023-02-03 LAB
INR PPP: 2.5
PROTHROMBIN TIME: 24.6 SEC (ref 9.1–12.3)

## 2023-02-15 ENCOUNTER — TELEPHONE (OUTPATIENT)
Dept: ORTHOPEDIC SURGERY | Age: 73
End: 2023-02-15

## 2023-02-15 NOTE — TELEPHONE ENCOUNTER
The order for the EMG was sent to [facility] at the request of the patient. The was patient was contacted and voiced understanding.

## 2023-02-28 ENCOUNTER — TELEPHONE (OUTPATIENT)
Dept: ORTHOPEDIC SURGERY | Age: 73
End: 2023-02-28

## 2023-02-28 NOTE — TELEPHONE ENCOUNTER
Patient is asking for a return call regarding his EMG results from [facility] on 2/20. Thank you.

## 2023-02-28 NOTE — TELEPHONE ENCOUNTER
Dr. Claudetta Shelter, pt's EMG from 2/20/23 has been uploaded into pt's media. Please review and advise on next steps and recommendations.

## 2023-02-28 NOTE — TELEPHONE ENCOUNTER
Called pt and advised that we do not have the EMG report. Asked pt for the number for [facility] where he had him EMG done so that I could call and request that they fax that over but he could not find the number so he was given our fax number to call and give them once he finds the number. Pt was advised that Dr. Gill Jefferson has been out on leave but will be returning next week and we will reach back out to him once we receive his EMG report and Dr. Gill Jefferson has a chance to review it. Pt voiced understanding.

## 2023-03-09 ENCOUNTER — HOSPITAL ENCOUNTER (OUTPATIENT)
Age: 73
Setting detail: SPECIMEN
Discharge: HOME | End: 2023-03-09

## 2023-03-09 LAB
INR PPP: 2.9
PROTHROMBIN TIME: 28 SEC (ref 9.1–12.3)

## 2023-03-21 ENCOUNTER — HOSPITAL ENCOUNTER (OUTPATIENT)
Dept: PREADMISSION TESTING | Age: 73
Discharge: HOME | End: 2023-03-25
Attending: ORTHOPAEDIC SURGERY | Admitting: ORTHOPAEDIC SURGERY
Payer: MEDICARE

## 2023-03-21 VITALS
HEART RATE: 75 BPM | SYSTOLIC BLOOD PRESSURE: 139 MMHG | RESPIRATION RATE: 18 BRPM | DIASTOLIC BLOOD PRESSURE: 72 MMHG | BODY MASS INDEX: 38.57 KG/M2 | HEIGHT: 66 IN | OXYGEN SATURATION: 98 % | WEIGHT: 240 LBS | TEMPERATURE: 97.3 F

## 2023-03-21 LAB
ABSOLUTE EOS #: 0.3 K/UL (ref 0–0.4)
ABSOLUTE LYMPH #: 1.6 K/UL (ref 1–4.8)
ABSOLUTE MONO #: 0.8 K/UL (ref 0.1–1.3)
ANION GAP SERPL CALCULATED.3IONS-SCNC: 10 MMOL/L (ref 9–17)
BASOPHILS # BLD: 1 % (ref 0–2)
BASOPHILS ABSOLUTE: 0.1 K/UL (ref 0–0.2)
BUN SERPL-MCNC: 18 MG/DL (ref 8–23)
CALCIUM SERPL-MCNC: 9.3 MG/DL (ref 8.6–10.4)
CHLORIDE SERPL-SCNC: 106 MMOL/L (ref 98–107)
CO2 SERPL-SCNC: 24 MMOL/L (ref 20–31)
CREAT SERPL-MCNC: 1.01 MG/DL (ref 0.7–1.2)
EOSINOPHILS RELATIVE PERCENT: 6 % (ref 0–4)
GFR SERPL CREATININE-BSD FRML MDRD: >60 ML/MIN/1.73M2
GLUCOSE SERPL-MCNC: 151 MG/DL (ref 70–99)
HCT VFR BLD AUTO: 43.8 % (ref 41–53)
HGB BLD-MCNC: 14.7 G/DL (ref 13.5–17.5)
LYMPHOCYTES # BLD: 27 % (ref 24–44)
MCH RBC QN AUTO: 28.1 PG (ref 26–34)
MCHC RBC AUTO-ENTMCNC: 33.4 G/DL (ref 31–37)
MCV RBC AUTO: 83.9 FL (ref 80–100)
MONOCYTES # BLD: 14 % (ref 1–7)
PDW BLD-RTO: 15.9 % (ref 11.5–14.9)
PLATELET # BLD AUTO: 193 K/UL (ref 150–450)
PMV BLD AUTO: 8.7 FL (ref 6–12)
POTASSIUM SERPL-SCNC: 4.6 MMOL/L (ref 3.7–5.3)
RBC # BLD: 5.22 M/UL (ref 4.5–5.9)
SEG NEUTROPHILS: 52 % (ref 36–66)
SEGMENTED NEUTROPHILS ABSOLUTE COUNT: 3 K/UL (ref 1.3–9.1)
SODIUM SERPL-SCNC: 140 MMOL/L (ref 135–144)
WBC # BLD AUTO: 5.8 K/UL (ref 3.5–11)

## 2023-03-21 PROCEDURE — 85025 COMPLETE CBC W/AUTO DIFF WBC: CPT

## 2023-03-21 PROCEDURE — 80048 BASIC METABOLIC PNL TOTAL CA: CPT

## 2023-03-21 PROCEDURE — 36415 COLL VENOUS BLD VENIPUNCTURE: CPT

## 2023-03-21 RX ORDER — ROPINIROLE 0.5 MG/1
0.5 TABLET, FILM COATED ORAL NIGHTLY
COMMUNITY

## 2023-03-21 RX ORDER — CYCLOBENZAPRINE HCL 5 MG
5 TABLET ORAL 3 TIMES DAILY PRN
COMMUNITY

## 2023-03-21 RX ORDER — AMLODIPINE BESYLATE 5 MG/1
5 TABLET ORAL DAILY
COMMUNITY

## 2023-03-21 RX ORDER — ALBUTEROL SULFATE 90 UG/1
2 AEROSOL, METERED RESPIRATORY (INHALATION) EVERY 6 HOURS PRN
COMMUNITY

## 2023-03-21 RX ORDER — MONTELUKAST SODIUM 10 MG/1
10 TABLET ORAL NIGHTLY
COMMUNITY

## 2023-03-21 ASSESSMENT — PAIN DESCRIPTION - ORIENTATION: ORIENTATION: LEFT;LOWER

## 2023-03-21 ASSESSMENT — PAIN DESCRIPTION - LOCATION: LOCATION: BACK

## 2023-03-21 ASSESSMENT — PAIN SCALES - GENERAL: PAINLEVEL_OUTOF10: 4

## 2023-03-21 ASSESSMENT — PAIN DESCRIPTION - PAIN TYPE: TYPE: CHRONIC PAIN

## 2023-03-21 ASSESSMENT — PAIN DESCRIPTION - DESCRIPTORS: DESCRIPTORS: ACHING

## 2023-03-21 NOTE — DISCHARGE INSTRUCTIONS
surgery. A physical assessment will be performed by a nurse practitioner or house officer. Your IV will be started and you will meet your anesthesiologist.    When you go to surgery, your family will be directed to the surgical waiting room, where the doctor should speak with them after your surgery. After surgery, you will be taken to the recovery room then when you are awake and stable you will go to the short stay unit for preparation to be discharged. If you use a Bi-PAP or C-PAP machine, please bring it with you and leave it in the car in case it is needed in recovery room.

## 2023-03-21 NOTE — PROGRESS NOTES
Patient has cardiac clearance which is included in surgery chart. Patient saw Dr Al Pat this morning (hem/onc), patient states that he was on coumadin due to blood clots in his lungs 11 years ago and this morning Dr Al Pat told him he can stop taking it, so patient's last dose of coumadin was this morning. Patient is aware to stop plavix 5 days prior to surgery per cardiac clearance.

## 2023-04-03 ENCOUNTER — ANESTHESIA EVENT (OUTPATIENT)
Dept: OPERATING ROOM | Age: 73
End: 2023-04-03
Payer: MEDICARE

## 2023-04-03 NOTE — PRE-PROCEDURE INSTRUCTIONS
Nothing to eat after midnight. Are you taking any blood thinners? When was the last day? Make sure to use Hibiclens prior to surgery. Remove any jewelry and body piercings. Do you wear glasses? If so, please bring a case to store them in. Are you having any Covid symptoms? Do you have any new rashes, infections, etc. that we should be aware of? Do you have a ride home the day of surgery? It cannot be a cab or medical transportation.   Verify surgery time and what time to arrive at hospital.  NO ANSWER, VM LEFT

## 2023-04-04 ENCOUNTER — HOSPITAL ENCOUNTER (OUTPATIENT)
Age: 73
Setting detail: OUTPATIENT SURGERY
Discharge: HOME | End: 2023-04-04
Attending: ORTHOPAEDIC SURGERY | Admitting: ORTHOPAEDIC SURGERY
Payer: MEDICARE

## 2023-04-04 ENCOUNTER — ANESTHESIA (OUTPATIENT)
Dept: OPERATING ROOM | Age: 73
End: 2023-04-04
Payer: MEDICARE

## 2023-04-04 VITALS
DIASTOLIC BLOOD PRESSURE: 72 MMHG | OXYGEN SATURATION: 95 % | BODY MASS INDEX: 38.57 KG/M2 | HEART RATE: 74 BPM | SYSTOLIC BLOOD PRESSURE: 136 MMHG | RESPIRATION RATE: 18 BRPM | TEMPERATURE: 97.3 F | HEIGHT: 66 IN | WEIGHT: 240 LBS

## 2023-04-04 DIAGNOSIS — M48.061 LUMBAR FORAMINAL STENOSIS: Primary | ICD-10-CM

## 2023-04-04 DIAGNOSIS — G56.02 CARPAL TUNNEL SYNDROME, LEFT: ICD-10-CM

## 2023-04-04 PROCEDURE — 3700000001 HC ADD 15 MINUTES (ANESTHESIA): Performed by: ORTHOPAEDIC SURGERY

## 2023-04-04 PROCEDURE — 2580000003 HC RX 258: Performed by: ANESTHESIOLOGY

## 2023-04-04 PROCEDURE — 3700000000 HC ANESTHESIA ATTENDED CARE: Performed by: ORTHOPAEDIC SURGERY

## 2023-04-04 PROCEDURE — 2500000003 HC RX 250 WO HCPCS: Performed by: NURSE ANESTHETIST, CERTIFIED REGISTERED

## 2023-04-04 PROCEDURE — 3600000012 HC SURGERY LEVEL 2 ADDTL 15MIN: Performed by: ORTHOPAEDIC SURGERY

## 2023-04-04 PROCEDURE — 2709999900 HC NON-CHARGEABLE SUPPLY: Performed by: ORTHOPAEDIC SURGERY

## 2023-04-04 PROCEDURE — 3600000002 HC SURGERY LEVEL 2 BASE: Performed by: ORTHOPAEDIC SURGERY

## 2023-04-04 PROCEDURE — 6360000002 HC RX W HCPCS: Performed by: ORTHOPAEDIC SURGERY

## 2023-04-04 PROCEDURE — 7100000031 HC ASPR PHASE II RECOVERY - ADDTL 15 MIN: Performed by: ORTHOPAEDIC SURGERY

## 2023-04-04 PROCEDURE — 7100000011 HC PHASE II RECOVERY - ADDTL 15 MIN: Performed by: ORTHOPAEDIC SURGERY

## 2023-04-04 PROCEDURE — 7100000000 HC PACU RECOVERY - FIRST 15 MIN: Performed by: ORTHOPAEDIC SURGERY

## 2023-04-04 PROCEDURE — 7100000030 HC ASPR PHASE II RECOVERY - FIRST 15 MIN: Performed by: ORTHOPAEDIC SURGERY

## 2023-04-04 PROCEDURE — 6370000000 HC RX 637 (ALT 250 FOR IP): Performed by: ANESTHESIOLOGY

## 2023-04-04 PROCEDURE — 7100000001 HC PACU RECOVERY - ADDTL 15 MIN: Performed by: ORTHOPAEDIC SURGERY

## 2023-04-04 PROCEDURE — 7100000010 HC PHASE II RECOVERY - FIRST 15 MIN: Performed by: ORTHOPAEDIC SURGERY

## 2023-04-04 PROCEDURE — 6360000002 HC RX W HCPCS: Performed by: NURSE ANESTHETIST, CERTIFIED REGISTERED

## 2023-04-04 RX ORDER — HYDROCODONE BITARTRATE AND ACETAMINOPHEN 5; 325 MG/1; MG/1
1 TABLET ORAL EVERY 6 HOURS PRN
Qty: 20 TABLET | Refills: 0 | Status: SHIPPED | OUTPATIENT
Start: 2023-04-04 | End: 2023-04-09

## 2023-04-04 RX ORDER — ONDANSETRON 2 MG/ML
INJECTION INTRAMUSCULAR; INTRAVENOUS PRN
Status: DISCONTINUED | OUTPATIENT
Start: 2023-04-04 | End: 2023-04-04 | Stop reason: SDUPTHER

## 2023-04-04 RX ORDER — LIDOCAINE HYDROCHLORIDE 20 MG/ML
INJECTION, SOLUTION EPIDURAL; INFILTRATION; INTRACAUDAL; PERINEURAL PRN
Status: DISCONTINUED | OUTPATIENT
Start: 2023-04-04 | End: 2023-04-04 | Stop reason: SDUPTHER

## 2023-04-04 RX ORDER — EPHEDRINE SULFATE/0.9% NACL/PF 50 MG/5 ML
SYRINGE (ML) INTRAVENOUS PRN
Status: DISCONTINUED | OUTPATIENT
Start: 2023-04-04 | End: 2023-04-04 | Stop reason: SDUPTHER

## 2023-04-04 RX ORDER — FENTANYL CITRATE 50 UG/ML
INJECTION, SOLUTION INTRAMUSCULAR; INTRAVENOUS PRN
Status: DISCONTINUED | OUTPATIENT
Start: 2023-04-04 | End: 2023-04-04 | Stop reason: SDUPTHER

## 2023-04-04 RX ORDER — FENTANYL CITRATE 0.05 MG/ML
25 INJECTION, SOLUTION INTRAMUSCULAR; INTRAVENOUS EVERY 5 MIN PRN
Status: DISCONTINUED | OUTPATIENT
Start: 2023-04-04 | End: 2023-04-04 | Stop reason: HOSPADM

## 2023-04-04 RX ORDER — PROPOFOL 10 MG/ML
INJECTION, EMULSION INTRAVENOUS PRN
Status: DISCONTINUED | OUTPATIENT
Start: 2023-04-04 | End: 2023-04-04 | Stop reason: SDUPTHER

## 2023-04-04 RX ORDER — ONDANSETRON 2 MG/ML
4 INJECTION INTRAMUSCULAR; INTRAVENOUS
Status: DISCONTINUED | OUTPATIENT
Start: 2023-04-04 | End: 2023-04-04 | Stop reason: HOSPADM

## 2023-04-04 RX ORDER — SODIUM CHLORIDE 9 MG/ML
INJECTION, SOLUTION INTRAVENOUS PRN
Status: DISCONTINUED | OUTPATIENT
Start: 2023-04-04 | End: 2023-04-04 | Stop reason: HOSPADM

## 2023-04-04 RX ORDER — DIPHENHYDRAMINE HYDROCHLORIDE 50 MG/ML
12.5 INJECTION INTRAMUSCULAR; INTRAVENOUS
Status: DISCONTINUED | OUTPATIENT
Start: 2023-04-04 | End: 2023-04-04 | Stop reason: HOSPADM

## 2023-04-04 RX ORDER — SODIUM CHLORIDE 0.9 % (FLUSH) 0.9 %
5-40 SYRINGE (ML) INJECTION PRN
Status: DISCONTINUED | OUTPATIENT
Start: 2023-04-04 | End: 2023-04-04 | Stop reason: HOSPADM

## 2023-04-04 RX ORDER — SODIUM CHLORIDE 0.9 % (FLUSH) 0.9 %
5-40 SYRINGE (ML) INJECTION EVERY 12 HOURS SCHEDULED
Status: DISCONTINUED | OUTPATIENT
Start: 2023-04-04 | End: 2023-04-04 | Stop reason: HOSPADM

## 2023-04-04 RX ORDER — LIDOCAINE HYDROCHLORIDE 10 MG/ML
1 INJECTION, SOLUTION EPIDURAL; INFILTRATION; INTRACAUDAL; PERINEURAL
Status: DISCONTINUED | OUTPATIENT
Start: 2023-04-04 | End: 2023-04-04 | Stop reason: HOSPADM

## 2023-04-04 RX ORDER — ACETAMINOPHEN 500 MG
1000 TABLET ORAL ONCE
Status: COMPLETED | OUTPATIENT
Start: 2023-04-04 | End: 2023-04-04

## 2023-04-04 RX ORDER — WARFARIN SODIUM 5 MG/1
5 TABLET ORAL DAILY
COMMUNITY

## 2023-04-04 RX ORDER — METOCLOPRAMIDE HYDROCHLORIDE 5 MG/ML
10 INJECTION INTRAMUSCULAR; INTRAVENOUS
Status: DISCONTINUED | OUTPATIENT
Start: 2023-04-04 | End: 2023-04-04 | Stop reason: HOSPADM

## 2023-04-04 RX ORDER — SODIUM CHLORIDE, SODIUM LACTATE, POTASSIUM CHLORIDE, CALCIUM CHLORIDE 600; 310; 30; 20 MG/100ML; MG/100ML; MG/100ML; MG/100ML
INJECTION, SOLUTION INTRAVENOUS CONTINUOUS
Status: DISCONTINUED | OUTPATIENT
Start: 2023-04-04 | End: 2023-04-04 | Stop reason: HOSPADM

## 2023-04-04 RX ORDER — ROPIVACAINE HYDROCHLORIDE 5 MG/ML
INJECTION, SOLUTION EPIDURAL; INFILTRATION; PERINEURAL PRN
Status: DISCONTINUED | OUTPATIENT
Start: 2023-04-04 | End: 2023-04-04 | Stop reason: ALTCHOICE

## 2023-04-04 RX ORDER — DEXAMETHASONE SODIUM PHOSPHATE 4 MG/ML
INJECTION, SOLUTION INTRA-ARTICULAR; INTRALESIONAL; INTRAMUSCULAR; INTRAVENOUS; SOFT TISSUE PRN
Status: DISCONTINUED | OUTPATIENT
Start: 2023-04-04 | End: 2023-04-04 | Stop reason: SDUPTHER

## 2023-04-04 RX ADMIN — Medication 10 MG: at 14:22

## 2023-04-04 RX ADMIN — SODIUM CHLORIDE, POTASSIUM CHLORIDE, SODIUM LACTATE AND CALCIUM CHLORIDE: 600; 310; 30; 20 INJECTION, SOLUTION INTRAVENOUS at 13:56

## 2023-04-04 RX ADMIN — LIDOCAINE HYDROCHLORIDE 80 MG: 20 INJECTION, SOLUTION EPIDURAL; INFILTRATION; INTRACAUDAL; PERINEURAL at 14:13

## 2023-04-04 RX ADMIN — DEXAMETHASONE SODIUM PHOSPHATE 4 MG: 4 INJECTION, SOLUTION INTRAMUSCULAR; INTRAVENOUS at 14:18

## 2023-04-04 RX ADMIN — Medication 10 MG: at 14:25

## 2023-04-04 RX ADMIN — ONDANSETRON 4 MG: 2 INJECTION INTRAMUSCULAR; INTRAVENOUS at 14:18

## 2023-04-04 RX ADMIN — FENTANYL CITRATE 50 MCG: 50 INJECTION, SOLUTION INTRAMUSCULAR; INTRAVENOUS at 14:10

## 2023-04-04 RX ADMIN — Medication 15 MG: at 14:27

## 2023-04-04 RX ADMIN — ACETAMINOPHEN 1000 MG: 500 TABLET ORAL at 13:48

## 2023-04-04 RX ADMIN — FENTANYL CITRATE 50 MCG: 50 INJECTION, SOLUTION INTRAMUSCULAR; INTRAVENOUS at 14:22

## 2023-04-04 RX ADMIN — Medication 15 MG: at 14:20

## 2023-04-04 RX ADMIN — PROPOFOL 160 MG: 10 INJECTION, EMULSION INTRAVENOUS at 14:13

## 2023-04-04 ASSESSMENT — ENCOUNTER SYMPTOMS
APNEA: 1
DIARRHEA: 0
VOMITING: 0
WHEEZING: 1
SHORTNESS OF BREATH: 1
BACK PAIN: 1
ANAL BLEEDING: 0
CONSTIPATION: 0
COUGH: 1
NAUSEA: 1

## 2023-04-04 ASSESSMENT — PAIN - FUNCTIONAL ASSESSMENT: PAIN_FUNCTIONAL_ASSESSMENT: 0-10

## 2023-04-04 ASSESSMENT — PAIN SCALES - GENERAL: PAINLEVEL_OUTOF10: 0

## 2023-04-04 NOTE — H&P
palpitations or SOB. No fever or chills. Functional Capacity per patient:              1. Patient is ***able to walk 2 city blocks on level ground without SOB. 2. Patient is ***able to climb 2 flights of stairs without SOB. Patient denies any issues with Anesthesia    PAST MEDICAL HISTORY     Past Medical History:   Diagnosis Date    Acute pansinusitis     Allergic rhinitis     Asthma     Asthma     Dementia without behavioral disturbance ([facility]     Gastroesophageal reflux     History of DVT (deep vein thrombosis) 08/27/2015    Hx of blood clots     Hyperlipidemia     Hyperlipidemia LDL goal < 100     Hypertension     Hypertension, benign     Major depression, chronic 08/27/2015    Osteoarthritis     Pulmonary emboli ([facility] 2007 approx.     bilateral    Severe obesity (BMI 35.0-35.9 with comorbidity) [facility] 08/27/2015    Sleep apnea     cpap       SURGICAL HISTORY       Past Surgical History:   Procedure Laterality Date    ANKLE FUSION Right     BACK SURGERY      x3 has screws/hardware/rods    CARDIAC CATHETERIZATION  05/05/2020    stents x1    CARPAL TUNNEL RELEASE      COLONOSCOPY N/A 01/28/2021    COLONOSCOPY POLYPECTOMY SNARE/COLD BIOPSY performed by Karena Neff MD at [facility] 20 Bilateral     cataracts    JOINT REPLACEMENT Right     shoulder    SHOULDER ARTHROPLASTY Right     SPINAL CORD STIMULATOR SURGERY      TONSILLECTOMY         FAMILY HISTORY       Family History   Problem Relation Age of Onset    Heart Disease Father     High Blood Pressure Father        SOCIAL HISTORY       Social History     Socioeconomic History    Marital status:    Occupational History    Occupation: packaging     Employer: [employer]   Tobacco Use    Smoking status: Former    Smokeless tobacco: Never   Vaping Use    Vaping Use: Never used   Substance and Sexual Activity    Alcohol use: No     Alcohol/week: 0.0 standard drinks     Comment: very rare    Drug use: No           REVIEW OF
person, place, and time. Motor: No weakness.       Gait: Gait normal.   Psychiatric:         Mood and Affect: Mood normal.         Behavior: Behavior normal.                 PROVISIONAL DIAGNOSES / SURGERY:      Carpal tunnel syndrome on left [G56.02]    OPEN CARPAL TUNNEL RELEASE     Patient Active Problem List    Diagnosis Date Noted    Hyperplastic colon polyp 02/08/2021    Lumbar foraminal stenosis 10/06/2020    Lumbar degenerative disc disease 10/06/2020    Chronic low back pain 10/06/2020    PE (pulmonary thromboembolism) ([address] 10/19/2018    Dementia without behavioral disturbance (HCC)     BMI 37.0-37.9, adult 02/22/2018    Impacted cerumen of right ear 10/19/2017    S/P ankle fusion     Traumatic arthritis of ankle     Chronic pain of right ankle     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

## 2023-04-04 NOTE — ANESTHESIA PRE PROCEDURE
Department of Anesthesiology  Preprocedure Note       Name:  Stefano Boles   Age:  67 y.o.  :  1950                                          MRN:  [id number]         Date:  2023      Surgeon: Elo Rodriguez):  Ariella Flores MD    Procedure: Procedure(s):  OPEN CARPAL TUNNEL RELEASE    Medications prior to admission:   Prior to Admission medications    Medication Sig Start Date End Date Taking? Authorizing Provider   albuterol sulfate HFA (VENTOLIN HFA) 108 (90 Base) MCG/ACT inhaler Inhale 2 puffs into the lungs every 6 hours as needed for Wheezing    Historical Provider, MD   amLODIPine (NORVASC) 5 MG tablet Take 5 mg by mouth daily    Historical Provider, MD   cyclobenzaprine (FLEXERIL) 5 MG tablet Take 5 mg by mouth 3 times daily as needed for Muscle spasms    Historical Provider, MD   montelukast (SINGULAIR) 10 MG tablet Take 10 mg by mouth nightly    Historical Provider, MD   rOPINIRole (REQUIP) 0.5 MG tablet Take 0.5 mg by mouth at bedtime    Historical Provider, MD   acetaminophen-codeine (TYLENOL #3) 300-30 MG per tablet Take 1 tablet by mouth every 8 hours as needed for Pain.     Historical Provider, MD   clopidogrel (PLAVIX) 75 MG tablet Take 75 mg by mouth daily    Historical Provider, MD   carvedilol (COREG) 6.25 MG tablet Take 1 tablet by mouth daily 20   Historical Provider, MD   pantoprazole (PROTONIX) 20 MG tablet Take 20 mg by mouth in the morning and at bedtime    Historical Provider, MD   buPROPion (WELLBUTRIN SR) 100 MG extended release tablet Take 150 mg by mouth 2 times daily    Historical Provider, MD   acetaminophen (TYLENOL) 500 MG tablet Take 1,000 mg by mouth 3 times daily as needed for Pain    Historical Provider, MD   atorvastatin (LIPITOR) 40 MG tablet TAKE 1 TABLET EVERY DAY 17   Nataly Ha MD   losartan (COZAAR) 100 MG tablet TAKE 1 TABLET EVERY DAY 17   Nataly Ha MD   SYMBICORT 160-4.5 MCG/ACT AERO Inhale 2 puffs into the lungs daily

## 2023-04-04 NOTE — ANESTHESIA POSTPROCEDURE EVALUATION
Department of Anesthesiology  Postprocedure Note    Patient: Cristina Mera  MRN: [id number]  YOB: 1950  Date of evaluation: 4/4/2023      Procedure Summary     Date: 04/04/23 Room / Location: [address] [place] [address] / [address] [facility]    Anesthesia Start: 1410 Anesthesia Stop: 1554    Procedure: OPEN CARPAL TUNNEL RELEASE (Left: Hand) Diagnosis:       Carpal tunnel syndrome on left      (Carpal tunnel syndrome on left [G56.02])    Surgeons: Aguila Almanzar MD Responsible Provider: Mirian Stevenson MD    Anesthesia Type: General ASA Status: 3          Anesthesia Type: General    Kelley Phase I: Kelley Score: 10    Kelley Phase II:        Anesthesia Post Evaluation    Comments: POST- ANESTHESIA EVALUATION       Pt Name: Cristina Mera  MRN: [id number]  YOB: 1950  Date of evaluation: 4/4/2023  Time:  3:29 PM      /76   Pulse 73   Temp 97.3 °F (36.3 °C) (Infrared)   Resp 21   Ht 5' 6\" (1.676 m)   Wt 240 lb (108.9 kg)   SpO2 96%   BMI 38.74 kg/m²      Consciousness Level  Awake  Cardiopulmonary Status  Stable  Pain Adequately Treated YES  Nausea / Vomiting  NO  Adequate Hydration  YES  Anesthesia Related Complications NONE      Electronically signed by Mirian Stevenson MD on 4/4/2023 at 3:29 PM

## 2023-04-04 NOTE — OP NOTE
protect the underlying structures of the carpal tunnel while the TCL was divided with a #15 blade proximally and distally. The wrist was hyperextended to facilitate complete release of the TCL into the deep volar forearm fascia with blunt Metzenbaum scissor. Complete distal release was carried out with complete release verified. , the wound was irrigated and then injected with 6cc of 0.5% ropivacaine. The wound was then closed with4-0 nylon sutures in a vertical mattress pattern. A sterile bulky bandage was applied and wrapped with a 2inch ace bandage. The tourniquet was deflated at less than 20 minutes. The patient was awaken and taken to the PACU on good condition.        Electronically signed by Pancho Kidd MD on 4/4/2023 at 2:34 PM

## 2023-04-04 NOTE — PROGRESS NOTES
Dr. Omar Cortes notified that pt stopped his Warfarin 10 days ago and Plavix 4 days ago. He stated no need for PT/INR.

## 2023-04-04 NOTE — DISCHARGE INSTRUCTIONS
touch.  If swelling increases while elevated. Persistent nausea or vomiting and cant keep fluids down. If you are unable to urinate within 8 hours of surgery. Redness or swelling at IV site. For any questions or concerns you may have.

## 2023-05-08 ENCOUNTER — HOSPITAL ENCOUNTER (OUTPATIENT)
Age: 73
Setting detail: SPECIMEN
Discharge: HOME | End: 2023-05-08

## 2023-05-08 LAB
ABSOLUTE EOS #: 0.19 K/UL (ref 0–0.44)
ABSOLUTE IMMATURE GRANULOCYTE: <0.03 K/UL (ref 0–0.3)
ABSOLUTE LYMPH #: 1.82 K/UL (ref 1.1–3.7)
ABSOLUTE MONO #: 0.59 K/UL (ref 0.1–1.2)
ALBUMIN SERPL-MCNC: 4.2 G/DL (ref 3.5–5.2)
ALBUMIN/GLOBULIN RATIO: 1.8 (ref 1–2.5)
ALP SERPL-CCNC: 108 U/L (ref 40–129)
ALT SERPL-CCNC: 19 U/L (ref 5–41)
ANION GAP SERPL CALCULATED.3IONS-SCNC: 11 MMOL/L (ref 9–17)
AST SERPL-CCNC: 22 U/L
BASOPHILS # BLD: 1 % (ref 0–2)
BASOPHILS ABSOLUTE: 0.07 K/UL (ref 0–0.2)
BILIRUB SERPL-MCNC: 0.5 MG/DL (ref 0.3–1.2)
BUN SERPL-MCNC: 18 MG/DL (ref 8–23)
CALCIUM SERPL-MCNC: 9.3 MG/DL (ref 8.6–10.4)
CHLORIDE SERPL-SCNC: 102 MMOL/L (ref 98–107)
CO2 SERPL-SCNC: 21 MMOL/L (ref 20–31)
CREAT SERPL-MCNC: 1.08 MG/DL (ref 0.7–1.2)
EOSINOPHILS RELATIVE PERCENT: 3 % (ref 1–4)
FERRITIN SERPL-MCNC: 55 NG/ML (ref 30–400)
GFR SERPL CREATININE-BSD FRML MDRD: >60 ML/MIN/1.73M2
GLUCOSE SERPL-MCNC: 116 MG/DL (ref 70–99)
HCT VFR BLD AUTO: 43.1 % (ref 40.7–50.3)
HGB BLD-MCNC: 14.3 G/DL (ref 13–17)
HOMOCYSTEINE: 14.1 UMOL/L
IMMATURE GRANULOCYTES: 0 %
IRON SATURATION: 22 % (ref 20–55)
IRON SERPL-MCNC: 66 UG/DL (ref 59–158)
LYMPHOCYTES # BLD: 32 % (ref 24–43)
MCH RBC QN AUTO: 28.8 PG (ref 25.2–33.5)
MCHC RBC AUTO-ENTMCNC: 33.2 G/DL (ref 28.4–34.8)
MCV RBC AUTO: 86.7 FL (ref 82.6–102.9)
MONOCYTES # BLD: 11 % (ref 3–12)
NRBC AUTOMATED: 0 PER 100 WBC
PDW BLD-RTO: 14.5 % (ref 11.8–14.4)
PLATELET # BLD AUTO: 210 K/UL (ref 138–453)
PMV BLD AUTO: 11.2 FL (ref 8.1–13.5)
POTASSIUM SERPL-SCNC: 4.1 MMOL/L (ref 3.7–5.3)
PROT SERPL-MCNC: 6.5 G/DL (ref 6.4–8.3)
RBC # BLD: 4.97 M/UL (ref 4.21–5.77)
RBC # BLD: ABNORMAL 10*6/UL
SEG NEUTROPHILS: 53 % (ref 36–65)
SEGMENTED NEUTROPHILS ABSOLUTE COUNT: 2.95 K/UL (ref 1.5–8.1)
SODIUM SERPL-SCNC: 134 MMOL/L (ref 135–144)
TIBC SERPL-MCNC: 306 UG/DL (ref 250–450)
TRANSFERRIN SERPL-MCNC: 243 MG/DL (ref 200–360)
UNSATURATED IRON BINDING CAPACITY: 240 UG/DL (ref 112–347)
WBC # BLD AUTO: 5.6 K/UL (ref 3.5–11.3)

## 2023-05-10 LAB
B2 GLYCOPROT1 IGA SERPL IA-ACNC: 1.9 ELISA U/ML (ref 0–7)
B2 GLYCOPROT1 IGG SERPL IA-ACNC: 1.1 ELISA U/ML (ref 0–7)
B2 GLYCOPROT1 IGM SERPL IA-ACNC: <0.9 ELISA U/ML (ref 0–7)
CARDIOLIPIN IGA SER IA-ACNC: 3.9 APL (ref 0–14)
CARDIOLIPIN IGG SER IA-ACNC: 1.9 GPL (ref 0–10)
CARDIOLIPIN IGM SER IA-ACNC: 1.8 MPL (ref 0–10)
DILUTE RUSSELL VIPER VENOM TIME: NORMAL
INR PPP: NORMAL
LUPUS ANTICOAG: NORMAL
PARTIAL THROMBOPLASTIN TIME: NORMAL SEC
PROTHROMBIN TIME: NORMAL SEC

## 2023-05-17 LAB
AT III ACT/NOR PPP CHRO: 83 % (ref 83–122)
PROTEIN C ACTIVITY: 139 %

## 2023-05-24 LAB
CARDIOLIPIN IGA SER IA-ACNC: 3.9 APL (ref 0–14)
CARDIOLIPIN IGG SER IA-ACNC: 1.9 GPL (ref 0–10)
CARDIOLIPIN IGM SER IA-ACNC: 1.8 MPL (ref 0–10)
DILUTE RUSSELL VIPER VENOM TIME: NORMAL
INR PPP: 1.1
PARTIAL THROMBOPLASTIN TIME: 33.2 SEC (ref 23–36.5)
PROTHROMBIN TIME: 14 SEC (ref 11.7–14.9)

## 2023-05-25 LAB — PROTEIN S ACTIVITY: 67 % (ref 77–116)

## 2023-05-30 ENCOUNTER — TRANSCRIBE ORDERS (OUTPATIENT)
Dept: ADMINISTRATIVE | Age: 73
End: 2023-05-30

## 2023-05-30 DIAGNOSIS — Z98.1 ARTHRODESIS STATUS: Primary | ICD-10-CM

## 2023-08-17 ENCOUNTER — OFFICE VISIT (OUTPATIENT)
Dept: ORTHOPEDIC SURGERY | Age: 73
End: 2023-08-17
Payer: MEDICARE

## 2023-08-17 VITALS — RESPIRATION RATE: 16 BRPM | BODY MASS INDEX: 38.57 KG/M2 | WEIGHT: 240 LBS | HEIGHT: 66 IN

## 2023-08-17 DIAGNOSIS — M25.532 LEFT WRIST PAIN: Primary | ICD-10-CM

## 2023-08-17 PROCEDURE — 3017F COLORECTAL CA SCREEN DOC REV: CPT | Performed by: ORTHOPAEDIC SURGERY

## 2023-08-17 PROCEDURE — 1036F TOBACCO NON-USER: CPT | Performed by: ORTHOPAEDIC SURGERY

## 2023-08-17 PROCEDURE — 99213 OFFICE O/P EST LOW 20 MIN: CPT | Performed by: ORTHOPAEDIC SURGERY

## 2023-08-17 PROCEDURE — G8427 DOCREV CUR MEDS BY ELIG CLIN: HCPCS | Performed by: ORTHOPAEDIC SURGERY

## 2023-08-17 PROCEDURE — G8417 CALC BMI ABV UP PARAM F/U: HCPCS | Performed by: ORTHOPAEDIC SURGERY

## 2023-08-17 PROCEDURE — 1123F ACP DISCUSS/DSCN MKR DOCD: CPT | Performed by: ORTHOPAEDIC SURGERY

## 2023-08-17 NOTE — PROGRESS NOTES
on file   Social Connections: Not on file   Intimate Partner Violence: Not on file   Housing Stability: Not on file     Past Medical History:   Diagnosis Date    Acute pansinusitis     Allergic rhinitis     Asthma     Asthma     Dementia without behavioral disturbance ([address])     Gastroesophageal reflux     History of DVT (deep vein thrombosis) 08/27/2015    Hx of blood clots     Hyperlipidemia     Hyperlipidemia LDL goal < 100     Hypertension     Hypertension, benign     Major depression, chronic 08/27/2015    Osteoarthritis     Pulmonary emboli ([address]) 2007 approx. bilateral    Severe obesity (BMI 35.0-35.9 with comorbidity) ([address]) 08/27/2015    Sleep apnea     cpap     Past Surgical History:   Procedure Laterality Date    ANKLE FUSION Right     BACK SURGERY      x3 has screws/hardware/rods    CARDIAC CATHETERIZATION  05/05/2020    stents x1    CARPAL TUNNEL RELEASE      CARPAL TUNNEL RELEASE Left 4/4/2023    OPEN CARPAL TUNNEL RELEASE performed by Christopher Freed MD at [address] N/A 01/28/2021    COLONOSCOPY POLYPECTOMY SNARE/COLD BIOPSY performed by Yoni Marinelli MD at [address] Bilateral     cataracts    JOINT REPLACEMENT Right     shoulder    SHOULDER ARTHROPLASTY Right     SPINAL CORD STIMULATOR SURGERY      TONSILLECTOMY       Family History   Problem Relation Age of Onset    Heart Disease Father     High Blood Pressure Father    Plan  Patient was advised the above. He was given a thumb spica splint and some exercises to perform. If he continues to have problems patient be referred to a hand specialist good possible surgical intervention if symptoms exacerbate. Provider Attestation:  Maryuri Wray, personally performed the services described in this documentation. All medical record entries made by the scribe were at my direction and in my presence.  I have reviewed the chart and discharge instructions and agree that the records reflect my personal performance and is

## 2024-01-03 ENCOUNTER — HOSPITAL ENCOUNTER (EMERGENCY)
Facility: CLINIC | Age: 74
Discharge: HOME | End: 2024-01-03
Attending: EMERGENCY MEDICINE
Payer: MEDICARE

## 2024-01-03 ENCOUNTER — APPOINTMENT (OUTPATIENT)
Dept: GENERAL RADIOLOGY | Facility: CLINIC | Age: 74
End: 2024-01-03
Payer: MEDICARE

## 2024-01-03 VITALS
OXYGEN SATURATION: 93 % | HEIGHT: 66 IN | HEART RATE: 106 BPM | TEMPERATURE: 101.5 F | SYSTOLIC BLOOD PRESSURE: 149 MMHG | RESPIRATION RATE: 16 BRPM | DIASTOLIC BLOOD PRESSURE: 79 MMHG | BODY MASS INDEX: 39.37 KG/M2 | WEIGHT: 245 LBS

## 2024-01-03 DIAGNOSIS — J10.1 INFLUENZA A: Primary | ICD-10-CM

## 2024-01-03 LAB
ANION GAP SERPL CALCULATED.3IONS-SCNC: 11 MMOL/L (ref 9–17)
BACTERIA URNS QL MICRO: ABNORMAL
BASOPHILS # BLD: 0.1 K/UL (ref 0–0.2)
BASOPHILS NFR BLD: 1 % (ref 0–2)
BILIRUB UR QL STRIP: NEGATIVE
BUN SERPL-MCNC: 13 MG/DL (ref 8–23)
CALCIUM SERPL-MCNC: 9.4 MG/DL (ref 8.6–10.4)
CHARACTER UR: ABNORMAL
CHLORIDE SERPL-SCNC: 100 MMOL/L (ref 98–107)
CLARITY UR: CLEAR
CO2 SERPL-SCNC: 23 MMOL/L (ref 20–31)
COLOR UR: YELLOW
CREAT SERPL-MCNC: 1.1 MG/DL (ref 0.7–1.2)
EOSINOPHIL # BLD: 0.1 K/UL (ref 0–0.4)
EOSINOPHILS RELATIVE PERCENT: 1 % (ref 1–4)
EPI CELLS #/AREA URNS HPF: ABNORMAL /HPF (ref 0–5)
ERYTHROCYTE [DISTWIDTH] IN BLOOD BY AUTOMATED COUNT: 15.3 % (ref 12.5–15.4)
FLUAV AG SPEC QL: POSITIVE
FLUBV AG SPEC QL: NEGATIVE
GFR SERPL CREATININE-BSD FRML MDRD: >60 ML/MIN/1.73M2
GLUCOSE SERPL-MCNC: 143 MG/DL (ref 70–99)
GLUCOSE UR STRIP-MCNC: NEGATIVE MG/DL
HCT VFR BLD AUTO: 43.3 % (ref 41–53)
HGB BLD-MCNC: 14.8 G/DL (ref 13.5–17.5)
HGB UR QL STRIP.AUTO: ABNORMAL
KETONES UR STRIP-MCNC: NEGATIVE MG/DL
LEUKOCYTE ESTERASE UR QL STRIP: NEGATIVE
LYMPHOCYTES NFR BLD: 0.9 K/UL (ref 1–4.8)
LYMPHOCYTES RELATIVE PERCENT: 10 % (ref 24–44)
MCH RBC QN AUTO: 28.9 PG (ref 26–34)
MCHC RBC AUTO-ENTMCNC: 34.2 G/DL (ref 31–37)
MCV RBC AUTO: 84.5 FL (ref 80–100)
MONOCYTES NFR BLD: 1.1 K/UL (ref 0.1–1.2)
MONOCYTES NFR BLD: 12 % (ref 2–11)
NEUTROPHILS NFR BLD: 76 % (ref 36–66)
NEUTS SEG NFR BLD: 7.3 K/UL (ref 1.8–7.7)
NITRITE UR QL STRIP: NEGATIVE
PH UR STRIP: 5.5 [PH] (ref 5–8)
PLATELET # BLD AUTO: 182 K/UL (ref 140–450)
PMV BLD AUTO: 8.2 FL (ref 6–12)
POTASSIUM SERPL-SCNC: 4.6 MMOL/L (ref 3.7–5.3)
PROT UR STRIP-MCNC: ABNORMAL MG/DL
RBC # BLD AUTO: 5.12 M/UL (ref 4.5–5.9)
RBC #/AREA URNS HPF: ABNORMAL /HPF (ref 0–2)
SARS-COV-2 RDRP RESP QL NAA+PROBE: NOT DETECTED
SODIUM SERPL-SCNC: 134 MMOL/L (ref 135–144)
SP GR UR STRIP: 1.02 (ref 1–1.03)
SPECIMEN DESCRIPTION: NORMAL
TROPONIN I SERPL HS-MCNC: 15 NG/L (ref 0–22)
UROBILINOGEN UR STRIP-ACNC: NORMAL EU/DL (ref 0–1)
WBC #/AREA URNS HPF: ABNORMAL /HPF (ref 0–5)
WBC OTHER # BLD: 9.5 K/UL (ref 3.5–11)

## 2024-01-03 PROCEDURE — 99285 EMERGENCY DEPT VISIT HI MDM: CPT

## 2024-01-03 PROCEDURE — 96360 HYDRATION IV INFUSION INIT: CPT

## 2024-01-03 PROCEDURE — 84484 ASSAY OF TROPONIN QUANT: CPT

## 2024-01-03 PROCEDURE — 80048 BASIC METABOLIC PNL TOTAL CA: CPT

## 2024-01-03 PROCEDURE — 81001 URINALYSIS AUTO W/SCOPE: CPT

## 2024-01-03 PROCEDURE — 87635 SARS-COV-2 COVID-19 AMP PRB: CPT

## 2024-01-03 PROCEDURE — 93005 ELECTROCARDIOGRAM TRACING: CPT | Performed by: REGISTERED NURSE

## 2024-01-03 PROCEDURE — 71045 X-RAY EXAM CHEST 1 VIEW: CPT

## 2024-01-03 PROCEDURE — 2580000003 HC RX 258: Performed by: REGISTERED NURSE

## 2024-01-03 PROCEDURE — 85025 COMPLETE CBC W/AUTO DIFF WBC: CPT

## 2024-01-03 PROCEDURE — 6370000000 HC RX 637 (ALT 250 FOR IP): Performed by: REGISTERED NURSE

## 2024-01-03 PROCEDURE — 87804 INFLUENZA ASSAY W/OPTIC: CPT

## 2024-01-03 RX ORDER — 0.9 % SODIUM CHLORIDE 0.9 %
1000 INTRAVENOUS SOLUTION INTRAVENOUS ONCE
Status: COMPLETED | OUTPATIENT
Start: 2024-01-03 | End: 2024-01-03

## 2024-01-03 RX ORDER — ONDANSETRON 4 MG/1
4 TABLET, ORALLY DISINTEGRATING ORAL 3 TIMES DAILY PRN
Qty: 21 TABLET | Refills: 0 | Status: SHIPPED | OUTPATIENT
Start: 2024-01-03

## 2024-01-03 RX ORDER — IBUPROFEN 800 MG/1
800 TABLET ORAL ONCE
Status: COMPLETED | OUTPATIENT
Start: 2024-01-03 | End: 2024-01-03

## 2024-01-03 RX ADMIN — IBUPROFEN 800 MG: 800 TABLET ORAL at 18:34

## 2024-01-03 RX ADMIN — SODIUM CHLORIDE 1000 ML: 9 INJECTION, SOLUTION INTRAVENOUS at 17:10

## 2024-01-03 ASSESSMENT — ENCOUNTER SYMPTOMS
NAUSEA: 0
BACK PAIN: 0
SHORTNESS OF BREATH: 0
COUGH: 1
SORE THROAT: 0
DIARRHEA: 0
ABDOMINAL PAIN: 0
VOMITING: 0

## 2024-01-03 ASSESSMENT — PAIN - FUNCTIONAL ASSESSMENT: PAIN_FUNCTIONAL_ASSESSMENT: NONE - DENIES PAIN

## 2024-01-03 NOTE — DISCHARGE INSTRUCTIONS
Please understand that at this time there is no evidence for a more serious underlying process, but that early in the process of an illness or injury, an emergency department workup can be falsely reassuring.  You should contact your family doctor within the next 48 hours for a follow up appointment    THANK YOU!!!    From [facility] and [facility]    On behalf of the Emergency Department staff at [facility], I would like to thank you for giving us the opportunity to address your health care needs and concerns.    We hope that during your visit, our service was delivered in a professional and caring manner. Please keep [facility] in mind as we walk with you down the path to your own personal wellness.     Please expect an automated text message or email from us so we can ask a few questions about your health and progress. Based on your answers, a clinician may call you back to offer help and instructions.    Please understand that early in the process of an illness or injury, an emergency department workup can be falsely reassuring.  If you notice any worsening, changing or persistent symptoms please call your family doctor or return to the ER immediately.     Tell us how we did during your visit at [email or web address]   and let us know about your experience

## 2024-01-03 NOTE — ED PROVIDER NOTES
[facility]  eMERGENCY dEPARTMENT eNCOUnter   Independent Attestation     Pt Name: Zachariah Rascon  MRN: [id number]  Birthdate 1950  Date of evaluation: 1/3/24       Zachariah Rascon is a 73 y.o. male who presents with Dizziness, Cough, and Fever    Twelve lead EKG interpreted by myself:  A 12 lead EKG done at 1705, interpreted by myself, showed a regular rhythm at a rate of 106bpm.  The W[place] interval was normal.  The QRS complex was normal.  There was no ST segment elevation or depression, T wave inversion not present.  QRS progression through precordial leads was grossly normal.  Interpretation: Normal sinus rhythm, no ST segment changes and no pattern consistent with acute ischemia or infarct    Based on the medical record, the care appears appropriate. I was personally available for consultation in the Emergency Department.    Devon Hong DO  Attending Emergency  Physician                Devon Hong DO  01/03/24 8018    
ration 86, QTc 441 within normal axis with no acute ST changes indicative of cardiac ischemia.    RADIOLOGY: All images are read by the radiologist and their interpretations are reviewed.    XR CHEST PORTABLE   Final Result   Hypoventilatory changes in the lung bases.  Scar noted in the right upper   lobe.             No results found.    LABS:  Results for orders placed or performed during the hospital encounter of 01/03/24   COVID-19, Rapid    Specimen: Nasopharyngeal Swab   Result Value Ref Range    Specimen Description .NASOPHARYNGEAL SWAB     SARS-CoV-2, Rapid Not Detected Not Detected   Rapid Influenza A/B Antigens    Specimen: Nasopharyngeal   Result Value Ref Range    Flu A Antigen POSITIVE (A) NEGATIVE    Flu B Antigen NEGATIVE NEGATIVE   Basic Metabolic Panel   Result Value Ref Range    Sodium 134 (L) 135 - 144 mmol/L    Potassium 4.6 3.7 - 5.3 mmol/L    Chloride 100 98 - 107 mmol/L    CO2 23 20 - 31 mmol/L    Anion Gap 11 9 - 17 mmol/L    Glucose 143 (H) 70 - 99 mg/dL    BUN 13 8 - 23 mg/dL    Creatinine 1.1 0.7 - 1.2 mg/dL    Est, Glom Filt Rate >60 >60 mL/min/1.73m2    Calcium 9.4 8.6 - 10.4 mg/dL   CBC with Auto Differential   Result Value Ref Range    WBC 9.5 3.5 - 11.0 k/uL    RBC 5.12 4.5 - 5.9 m/uL    Hemoglobin 14.8 13.5 - 17.5 g/dL    Hematocrit 43.3 41 - 53 %    MCV 84.5 80 - 100 fL    MCH 28.9 26 - 34 pg    MCHC 34.2 31 - 37 g/dL    RDW 15.3 12.5 - 15.4 %    Platelets 182 140 - 450 k/uL    MPV 8.2 6.0 - 12.0 fL    Neutrophils % 76 (H) 36 - 66 %    Lymphocytes % 10 (L) 24 - 44 %    Monocytes % 12 (H) 2 - 11 %    Eosinophils % 1 1 - 4 %    Basophils % 1 0 - 2 %    Neutrophils Absolute 7.30 1.8 - 7.7 k/uL    Lymphocytes Absolute 0.90 (L) 1.0 - 4.8 k/uL    Monocytes Absolute 1.10 0.1 - 1.2 k/uL    Eosinophils Absolute 0.10 0.0 - 0.4 k/uL    Basophils Absolute 0.10 0.0 - 0.2 k/uL   Troponin   Result Value Ref Range    Troponin, High Sensitivity 15 0 - 22 ng/L   Urinalysis with Reflex to Culture

## 2024-01-04 LAB
EKG ATRIAL RATE: 106 BPM
EKG P AXIS: -21 DEGREES
EKG P-R INTERVAL: 144 MS
EKG Q-T INTERVAL: 332 MS
EKG QRS DURATION: 86 MS
EKG QTC CALCULATION (BAZETT): 441 MS
EKG R AXIS: -19 DEGREES
EKG T AXIS: 17 DEGREES
EKG VENTRICULAR RATE: 106 BPM

## 2024-01-19 ENCOUNTER — HOSPITAL ENCOUNTER (OUTPATIENT)
Age: 74
Setting detail: SPECIMEN
Discharge: HOME | End: 2024-01-19

## 2024-01-22 LAB
PROT S AG ACT/NOR PPP IA: 126 % (ref 84–134)
PROT S FREE AG ACT/NOR PPP IA: 105 % (ref 74–147)

## 2024-01-24 LAB — PROTEIN S ACTIVITY: 62 % (ref 77–116)

## 2024-02-21 PROBLEM — H91.93 BILATERAL HEARING LOSS: Status: ACTIVE | Noted: 2023-08-07
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
## 2024-02-21 PROBLEM — G47.33 OSA (OBSTRUCTIVE SLEEP APNEA): Status: ACTIVE | Noted: 2021-02-10

## 2024-02-21 PROBLEM — G25.81 RLS (RESTLESS LEGS SYNDROME): Status: ACTIVE | Noted: 2022-06-28

## 2024-02-21 PROBLEM — H62.41 OTOMYCOSIS OF RIGHT EAR: Status: ACTIVE | Noted: 2022-12-20

## 2024-02-21 PROBLEM — H61.23 CERUMEN DEBRIS ON TYMPANIC MEMBRANE OF BOTH EARS: Status: ACTIVE | Noted: 2022-12-20

## 2024-02-21 PROBLEM — R06.02 SHORTNESS OF BREATH: Status: ACTIVE | Noted: 2021-02-10

## 2024-02-21 PROBLEM — I35.1 AORTIC VALVE REGURGITATION: Status: ACTIVE | Noted: 2022-06-30

## 2024-02-21 PROBLEM — M40.36 FLATBACK SYNDROME OF LUMBAR REGION: Status: ACTIVE | Noted: 2022-06-28

## 2024-02-21 PROBLEM — R73.09 INCREASED GLUCOSE LEVEL: Status: ACTIVE | Noted: 2022-06-28

## 2024-02-21 PROBLEM — B36.9 OTOMYCOSIS OF RIGHT EAR: Status: ACTIVE | Noted: 2022-12-20

## 2024-02-21 PROBLEM — E55.9 VITAMIN D DEFICIENCY: Status: ACTIVE | Noted: 2019-08-13

## 2024-02-21 PROBLEM — I25.119 CORONARY ARTERY DISEASE INVOLVING NATIVE CORONARY ARTERY OF NATIVE HEART WITH ANGINA PECTORIS (HCC): Status: ACTIVE | Noted: 2021-02-10

## 2024-02-28 PROBLEM — Z95.5 S/P CORONARY ARTERY STENT PLACEMENT: Status: ACTIVE | Noted: 2024-02-28

## 2024-02-28 PROBLEM — I10 HYPERTENSION, ESSENTIAL: Status: ACTIVE | Noted: 2024-02-28

## 2024-02-28 PROBLEM — R06.02 SOB (SHORTNESS OF BREATH): Status: ACTIVE | Noted: 2024-02-28

## 2024-02-28 PROBLEM — I25.10 CORONARY ARTERY DISEASE INVOLVING NATIVE CORONARY ARTERY OF NATIVE HEART WITHOUT ANGINA PECTORIS: Status: ACTIVE | Noted: 2024-02-28

## 2024-02-28 PROBLEM — Z98.890 S/P CARDIAC CATH: Status: ACTIVE | Noted: 2024-02-28

## 2024-02-28 PROBLEM — E66.812 CLASS 2 SEVERE OBESITY DUE TO EXCESS CALORIES WITH SERIOUS COMORBIDITY IN ADULT: Status: ACTIVE | Noted: 2024-02-28

## 2024-02-28 PROBLEM — E78.00 PURE HYPERCHOLESTEROLEMIA: Status: ACTIVE | Noted: 2024-02-28

## 2024-02-28 PROBLEM — I35.1 MODERATE AORTIC INSUFFICIENCY: Status: ACTIVE | Noted: 2024-02-28

## 2024-02-28 PROBLEM — E66.01 CLASS 2 SEVERE OBESITY DUE TO EXCESS CALORIES WITH SERIOUS COMORBIDITY IN ADULT (HCC): Status: ACTIVE | Noted: 2024-02-28

## 2024-07-17 ENCOUNTER — HOSPITAL ENCOUNTER (OUTPATIENT)
Age: 74
Setting detail: SPECIMEN
Discharge: HOME | End: 2024-07-17

## 2024-07-26 LAB — PROTEIN S ACTIVITY: 83 % (ref 77–116)

## 2024-12-12 ENCOUNTER — HOSPITAL ENCOUNTER (OUTPATIENT)
Age: 74
Setting detail: SPECIMEN
Discharge: HOME | End: 2024-12-12

## 2024-12-12 LAB
CHOLEST SERPL-MCNC: 119 MG/DL (ref 0–199)
CHOLESTEROL/HDL RATIO: 2.6
EST. AVERAGE GLUCOSE BLD GHB EST-MCNC: 134 MG/DL
HBA1C MFR BLD: 6.3 % (ref 4–6)
HDLC SERPL-MCNC: 46 MG/DL
LDLC SERPL CALC-MCNC: 54 MG/DL (ref 0–100)
TRIGL SERPL-MCNC: 93 MG/DL (ref 0–149)
VLDLC SERPL CALC-MCNC: 19 MG/DL (ref 1–30)

## 2025-01-30 ENCOUNTER — HOSPITAL ENCOUNTER (OUTPATIENT)
Age: 75
Setting detail: SPECIMEN
Discharge: HOME | End: 2025-01-30

## 2025-01-30 DIAGNOSIS — R06.02 SOB (SHORTNESS OF BREATH): ICD-10-CM

## 2025-01-30 LAB
ANION GAP SERPL CALCULATED.3IONS-SCNC: 12 MMOL/L (ref 9–16)
BASOPHILS # BLD: 0.08 K/UL (ref 0–0.2)
BASOPHILS NFR BLD: 1 % (ref 0–2)
BUN SERPL-MCNC: 12 MG/DL (ref 8–23)
CALCIUM SERPL-MCNC: 9.4 MG/DL (ref 8.6–10.4)
CHLORIDE SERPL-SCNC: 104 MMOL/L (ref 98–107)
CO2 SERPL-SCNC: 24 MMOL/L (ref 20–31)
CREAT SERPL-MCNC: 1.1 MG/DL (ref 0.7–1.2)
EOSINOPHIL # BLD: 0.28 K/UL (ref 0–0.44)
EOSINOPHILS RELATIVE PERCENT: 4 % (ref 1–4)
ERYTHROCYTE [DISTWIDTH] IN BLOOD BY AUTOMATED COUNT: 15 % (ref 11.8–14.4)
GFR, ESTIMATED: 70 ML/MIN/1.73M2
GLUCOSE SERPL-MCNC: 132 MG/DL (ref 74–99)
HCT VFR BLD AUTO: 45 % (ref 40.7–50.3)
HGB BLD-MCNC: 15.1 G/DL (ref 13–17)
IMM GRANULOCYTES # BLD AUTO: <0.03 K/UL (ref 0–0.3)
IMM GRANULOCYTES NFR BLD: 0 %
LYMPHOCYTES NFR BLD: 2.34 K/UL (ref 1.1–3.7)
LYMPHOCYTES RELATIVE PERCENT: 37 % (ref 24–43)
MCH RBC QN AUTO: 28.7 PG (ref 25.2–33.5)
MCHC RBC AUTO-ENTMCNC: 33.6 G/DL (ref 28.4–34.8)
MCV RBC AUTO: 85.4 FL (ref 82.6–102.9)
MONOCYTES NFR BLD: 0.83 K/UL (ref 0.1–1.2)
MONOCYTES NFR BLD: 13 % (ref 3–12)
NEUTROPHILS NFR BLD: 45 % (ref 36–65)
NEUTS SEG NFR BLD: 2.82 K/UL (ref 1.5–8.1)
NRBC BLD-RTO: 0 PER 100 WBC
PLATELET # BLD AUTO: 225 K/UL (ref 138–453)
PMV BLD AUTO: 10.6 FL (ref 8.1–13.5)
POTASSIUM SERPL-SCNC: 4.3 MMOL/L (ref 3.7–5.3)
RBC # BLD AUTO: 5.27 M/UL (ref 4.21–5.77)
RBC # BLD: ABNORMAL 10*6/UL
SODIUM SERPL-SCNC: 140 MMOL/L (ref 136–145)
WBC OTHER # BLD: 6.4 K/UL (ref 3.5–11.3)

## 2025-08-26 ENCOUNTER — HOSPITAL ENCOUNTER (OUTPATIENT)
Age: 75
Setting detail: SPECIMEN
Discharge: HOME | End: 2025-08-26

## 2025-08-26 DIAGNOSIS — R06.02 SHORTNESS OF BREATH: ICD-10-CM

## 2025-08-26 DIAGNOSIS — I25.10 CORONARY ARTERY DISEASE INVOLVING NATIVE CORONARY ARTERY OF NATIVE HEART WITHOUT ANGINA PECTORIS: ICD-10-CM

## 2025-08-26 LAB
ANION GAP SERPL CALCULATED.3IONS-SCNC: 15 MMOL/L (ref 9–16)
BUN SERPL-MCNC: 15 MG/DL (ref 8–23)
CALCIUM SERPL-MCNC: 9.4 MG/DL (ref 8.6–10.4)
CHLORIDE SERPL-SCNC: 105 MMOL/L (ref 98–107)
CO2 SERPL-SCNC: 20 MMOL/L (ref 20–31)
CREAT SERPL-MCNC: 1 MG/DL (ref 0.7–1.2)
GFR, ESTIMATED: 78 ML/MIN/1.73M2
GLUCOSE SERPL-MCNC: 119 MG/DL (ref 74–99)
POTASSIUM SERPL-SCNC: 4.3 MMOL/L (ref 3.7–5.3)
SODIUM SERPL-SCNC: 140 MMOL/L (ref 136–145)

## (undated) DEVICE — FORCEPS BX L240CM WRK CHN 2.8MM STD CAP W/ NDL MIC MESH

## (undated) DEVICE — GOWN,AURORA,NONREINFORCED,LARGE: Brand: MEDLINE

## (undated) DEVICE — BASIN EMSIS 700ML GRAPHITE PLAS KID SHP GRAD

## (undated) DEVICE — MEDICINE CUP, GRADUATED, STER: Brand: MEDLINE

## (undated) DEVICE — TUBING, SUCTION, 1/4" X 12', STRAIGHT: Brand: MEDLINE

## (undated) DEVICE — CO2 CANNULA,SUPERSOFT, ADLT,7'O2,7'CO2: Brand: MEDLINE

## (undated) DEVICE — MERCY HEALTH ST CHARLES: Brand: MEDLINE INDUSTRIES, INC.

## (undated) DEVICE — SYRINGE MED 50ML LUERLOCK TIP

## (undated) DEVICE — ADAPTER TBNG LUER STUB 15 GA INTMED

## (undated) DEVICE — Device: Brand: DEFENDO VALVE AND CONNECTOR KIT

## (undated) DEVICE — PADDING CAST W2INXL4YD COT LO LINTING WYTEX

## (undated) DEVICE — SUTURE ETHLN SZ 3-0 L18IN NONABSORBABLE BLK FS-1 L24MM 3/8 663H

## (undated) DEVICE — ELECTRODE PT RET AD L9FT HI MOIST COND ADH HYDRGEL CORDED

## (undated) DEVICE — TRAP SURG QUAD PARABOLA SLOT DSGN SFTY SCRN TRAPEASE

## (undated) DEVICE — GOWN,SIRUS,NONRNF,SETINSLV,XL,20/CS: Brand: MEDLINE

## (undated) DEVICE — ZIMMER® STERILE DISPOSABLE TOURNIQUET CUFF WITH PLC, DUAL PORT, SINGLE BLADDER, 18 IN. (46 CM)

## (undated) DEVICE — Device

## (undated) DEVICE — DRESSING,GAUZE,XEROFORM,CURAD,1"X8",ST: Brand: CURAD

## (undated) DEVICE — GLOVE ORTHO 8   MSG9480

## (undated) DEVICE — BANDAGE,ELASTIC,ESMARK,STERILE,4"X9',LF: Brand: MEDLINE

## (undated) DEVICE — BNDG,ELSTC,MATRIX,STRL,2"X5YD,LF,HOOK&LP: Brand: MEDLINE